# Patient Record
Sex: FEMALE | Race: ASIAN | NOT HISPANIC OR LATINO | URBAN - METROPOLITAN AREA
[De-identification: names, ages, dates, MRNs, and addresses within clinical notes are randomized per-mention and may not be internally consistent; named-entity substitution may affect disease eponyms.]

---

## 2020-01-26 ENCOUNTER — EMERGENCY (EMERGENCY)
Facility: HOSPITAL | Age: 55
LOS: 1 days | Discharge: HOME | End: 2020-01-26
Admitting: EMERGENCY MEDICINE
Payer: SELF-PAY

## 2020-01-26 VITALS
HEART RATE: 100 BPM | SYSTOLIC BLOOD PRESSURE: 109 MMHG | DIASTOLIC BLOOD PRESSURE: 75 MMHG | RESPIRATION RATE: 20 BRPM | TEMPERATURE: 98 F | OXYGEN SATURATION: 100 %

## 2020-01-26 PROCEDURE — 99282 EMERGENCY DEPT VISIT SF MDM: CPT

## 2020-01-26 PROCEDURE — 99053 MED SERV 10PM-8AM 24 HR FAC: CPT

## 2020-01-26 NOTE — ED PROVIDER NOTE - OBJECTIVE STATEMENT
53 yo f with pmh of CP bib aid for abrasion to L finger. As per aide another aide noticed it around 9pm. Pt is always picking at her fingers and she thinks maybe the nail was long and it broke and cut her skin. No known injury. Aide states the agency made her come to be evaluated.

## 2020-01-26 NOTE — ED PROVIDER NOTE - CLINICAL SUMMARY MEDICAL DECISION MAKING FREE TEXT BOX
53 yo f with pmh of CP bib aid for abrasion to L finger. As per aide another aide noticed it around 9pm. Pt is always picking at her fingers and she thinks maybe the nail was long and it broke and cut her skin. No known injury. Aide states the agency made her come to be evaluated. L thumb- small 0.5cm abrasion to tip of thumb

## 2020-01-26 NOTE — ED ADULT NURSE NOTE - CHPI ED NUR SYMPTOMS NEG
no bleeding at site/no vomiting/no drainage/no pain/no chills/no fever/no purulent drainage/no rectal pain/no redness/no blood in mucus

## 2020-01-26 NOTE — ED PROVIDER NOTE - NSFOLLOWUPINSTRUCTIONS_ED_ALL_ED_FT
Keep hands clean. Apply bacitracin to finger. Return to ED for pain, fever, chills, swelling, redness or any other concerning symptoms.     Skin Tear    WHAT YOU NEED TO KNOW:    A skin tear occurs when the layers of weakened skin split open from an injury. It is important to treat and prevent skin tears to prevent infection.    DISCHARGE INSTRUCTIONS:    Call your doctor if:     You have a fever or chills.      Blood soaks through your bandage.      You have redness, swelling, pus, or a bad odor coming from your wound.      You have severe pain.      Your wound tears open again.      You have questions or concerns about your condition or care.    Medicines:     Medicines may be given to decrease pain or treat a bacterial infection.      Take your medicine as directed. Contact your healthcare provider if you think your medicine is not helping or if you have side effects. Tell him of her if you are allergic to any medicine. Keep a list of the medicines, vitamins, and herbs you take. Include the amounts, and when and why you take them. Bring the list or the pill bottles to follow-up visits. Carry your medicine list with you in case of an emergency.    Prevent a skin tear:     Clean, moisturize, and protect your skin. Baths, hot showers, and soap can dry your skin and increase your risk for skin tears. Take lukewarm showers, use mild soap as directed, and gently pat your skin dry. Use lotion to keep your skin moist after you shower. Wear long sleeves, pants, and protective footwear.      Move carefully. Ask for help if you cannot lift yourself. Do not drag your skin when you move.      Keep your home safe. Cover sharp corners, keep your pathways clear, and turn on lights so you can see clearly. Ask for more information if you have questions about home safety.      Drink liquids as directed. Ask your provider how much liquid to drink each day and which liquids are best for you. Liquids will help keep your skin moist and protected from another skin tear.      Eat high-protein foods to help with wound healing. Examples are lean meats, fish, low-fat dairy products, and beans.     Follow up with your healthcare provider as directed: Write down your questions so you remember to ask them during your visits.

## 2020-01-26 NOTE — ED PROVIDER NOTE - PATIENT PORTAL LINK FT
You can access the FollowMyHealth Patient Portal offered by [facility] by registering at the following website: [email or web address]. By joining [employer]’s FollowMyHealth portal, you will also be able to view your health information using other applications (apps) compatible with our system.

## 2020-01-26 NOTE — ED PROVIDER NOTE - PHYSICAL EXAMINATION
CONSTITUTIONAL: short stature    HEAD: Normocephalic; atraumatic.   EYES: PERRL; EOM intact; conjunctiva and sclera clear  ENT: normal nose; no rhinorrhea; normal pharynx with no erythema or lesions.   NECK: Supple; non-tender;   CARDIOVASCULAR: Normal S1, S2; no murmurs, rubs, or gallops. Regular rate and rhythm.   RESPIRATORY: Breathing easily; breath sounds clear and equal bilaterally; no wheezes, rhonchi, or rales.  MSK: FROM at all extremities, normal tone   EXT: No cyanosis or edema; N/V intact  SKIN: L thumb- small 0.5cm abrasion to tip of thumb

## 2020-01-26 NOTE — ED ADULT NURSE NOTE - NSIMPLEMENTINTERV_GEN_ALL_ED
Implemented All Universal Safety Interventions:  [place] to call system. Call bell, personal items and telephone within reach. Instruct patient to call for assistance. Room bathroom lighting operational. Non-slip footwear when patient is off stretcher. Physically safe environment: no spills, clutter or unnecessary equipment. Stretcher in lowest position, wheels locked, appropriate side rails in place.

## 2020-02-01 DIAGNOSIS — S60.312A ABRASION OF LEFT THUMB, INITIAL ENCOUNTER: ICD-10-CM
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
## 2020-06-27 ENCOUNTER — EMERGENCY (EMERGENCY)
Facility: HOSPITAL | Age: 55
LOS: 1 days | Discharge: HOME | End: 2020-06-27
Attending: EMERGENCY MEDICINE | Admitting: EMERGENCY MEDICINE
Payer: MEDICARE

## 2020-06-27 VITALS
SYSTOLIC BLOOD PRESSURE: 112 MMHG | TEMPERATURE: 98 F | RESPIRATION RATE: 18 BRPM | OXYGEN SATURATION: 98 % | HEART RATE: 95 BPM | DIASTOLIC BLOOD PRESSURE: 80 MMHG

## 2020-06-27 VITALS
TEMPERATURE: 98 F | SYSTOLIC BLOOD PRESSURE: 111 MMHG | DIASTOLIC BLOOD PRESSURE: 73 MMHG | RESPIRATION RATE: 16 BRPM | OXYGEN SATURATION: 97 % | HEART RATE: 70 BPM

## 2020-06-27 LAB
ALBUMIN SERPL ELPH-MCNC: 3.7 G/DL — SIGNIFICANT CHANGE UP (ref 3.3–5)
ALP SERPL-CCNC: 91 U/L — SIGNIFICANT CHANGE UP (ref 40–120)
ALT FLD-CCNC: 16 U/L — SIGNIFICANT CHANGE UP (ref 10–45)
ANION GAP SERPL CALC-SCNC: 11 MMOL/L — SIGNIFICANT CHANGE UP (ref 5–17)
APTT BLD: 28.8 SEC — SIGNIFICANT CHANGE UP (ref 27.5–36.3)
AST SERPL-CCNC: 19 U/L — SIGNIFICANT CHANGE UP (ref 10–40)
BASOPHILS # BLD AUTO: 0.08 K/UL — SIGNIFICANT CHANGE UP (ref 0–0.2)
BASOPHILS NFR BLD AUTO: 1.6 % — SIGNIFICANT CHANGE UP (ref 0–2)
BILIRUB SERPL-MCNC: 0.3 MG/DL — SIGNIFICANT CHANGE UP (ref 0.2–1.2)
BUN SERPL-MCNC: 18 MG/DL — SIGNIFICANT CHANGE UP (ref 7–23)
CALCIUM SERPL-MCNC: 9.6 MG/DL — SIGNIFICANT CHANGE UP (ref 8.4–10.5)
CHLORIDE SERPL-SCNC: 108 MMOL/L — SIGNIFICANT CHANGE UP (ref 96–108)
CO2 SERPL-SCNC: 28 MMOL/L — SIGNIFICANT CHANGE UP (ref 22–31)
CREAT SERPL-MCNC: 0.61 MG/DL — SIGNIFICANT CHANGE UP (ref 0.5–1.3)
EOSINOPHIL # BLD AUTO: 0.12 K/UL — SIGNIFICANT CHANGE UP (ref 0–0.5)
EOSINOPHIL NFR BLD AUTO: 2.4 % — SIGNIFICANT CHANGE UP (ref 0–6)
GLUCOSE SERPL-MCNC: 111 MG/DL — HIGH (ref 70–99)
HCT VFR BLD CALC: 47 % — HIGH (ref 34.5–45)
HGB BLD-MCNC: 15 G/DL — SIGNIFICANT CHANGE UP (ref 11.5–15.5)
IMM GRANULOCYTES NFR BLD AUTO: 0.6 % — SIGNIFICANT CHANGE UP (ref 0–1.5)
INR BLD: 1 — SIGNIFICANT CHANGE UP (ref 0.88–1.16)
LYMPHOCYTES # BLD AUTO: 1.41 K/UL — SIGNIFICANT CHANGE UP (ref 1–3.3)
LYMPHOCYTES # BLD AUTO: 28.4 % — SIGNIFICANT CHANGE UP (ref 13–44)
MCHC RBC-ENTMCNC: 30.9 PG — SIGNIFICANT CHANGE UP (ref 27–34)
MCHC RBC-ENTMCNC: 31.9 GM/DL — LOW (ref 32–36)
MCV RBC AUTO: 96.9 FL — SIGNIFICANT CHANGE UP (ref 80–100)
MONOCYTES # BLD AUTO: 0.39 K/UL — SIGNIFICANT CHANGE UP (ref 0–0.9)
MONOCYTES NFR BLD AUTO: 7.9 % — SIGNIFICANT CHANGE UP (ref 2–14)
NEUTROPHILS # BLD AUTO: 2.93 K/UL — SIGNIFICANT CHANGE UP (ref 1.8–7.4)
NEUTROPHILS NFR BLD AUTO: 59.1 % — SIGNIFICANT CHANGE UP (ref 43–77)
NRBC # BLD: 0 /100 WBCS — SIGNIFICANT CHANGE UP (ref 0–0)
PLATELET # BLD AUTO: 334 K/UL — SIGNIFICANT CHANGE UP (ref 150–400)
POTASSIUM SERPL-MCNC: 5.2 MMOL/L — SIGNIFICANT CHANGE UP (ref 3.5–5.3)
POTASSIUM SERPL-SCNC: 5.2 MMOL/L — SIGNIFICANT CHANGE UP (ref 3.5–5.3)
PROT SERPL-MCNC: 7.8 G/DL — SIGNIFICANT CHANGE UP (ref 6–8.3)
PROTHROM AB SERPL-ACNC: 11.4 SEC — SIGNIFICANT CHANGE UP (ref 10–12.9)
RBC # BLD: 4.85 M/UL — SIGNIFICANT CHANGE UP (ref 3.8–5.2)
RBC # FLD: 15.9 % — HIGH (ref 10.3–14.5)
SARS-COV-2 RNA SPEC QL NAA+PROBE: SIGNIFICANT CHANGE UP
SODIUM SERPL-SCNC: 147 MMOL/L — HIGH (ref 135–145)
WBC # BLD: 4.96 K/UL — SIGNIFICANT CHANGE UP (ref 3.8–10.5)
WBC # FLD AUTO: 4.96 K/UL — SIGNIFICANT CHANGE UP (ref 3.8–10.5)

## 2020-06-27 PROCEDURE — 70450 CT HEAD/BRAIN W/O DYE: CPT

## 2020-06-27 PROCEDURE — 72125 CT NECK SPINE W/O DYE: CPT

## 2020-06-27 PROCEDURE — 85610 PROTHROMBIN TIME: CPT

## 2020-06-27 PROCEDURE — 99053 MED SERV 10PM-8AM 24 HR FAC: CPT

## 2020-06-27 PROCEDURE — 87635 SARS-COV-2 COVID-19 AMP PRB: CPT

## 2020-06-27 PROCEDURE — 93005 ELECTROCARDIOGRAM TRACING: CPT

## 2020-06-27 PROCEDURE — 93010 ELECTROCARDIOGRAM REPORT: CPT

## 2020-06-27 PROCEDURE — 96374 THER/PROPH/DIAG INJ IV PUSH: CPT

## 2020-06-27 PROCEDURE — 96376 TX/PRO/DX INJ SAME DRUG ADON: CPT

## 2020-06-27 PROCEDURE — 85025 COMPLETE CBC W/AUTO DIFF WBC: CPT

## 2020-06-27 PROCEDURE — 72125 CT NECK SPINE W/O DYE: CPT | Mod: 26

## 2020-06-27 PROCEDURE — 80053 COMPREHEN METABOLIC PANEL: CPT

## 2020-06-27 PROCEDURE — 70450 CT HEAD/BRAIN W/O DYE: CPT | Mod: 26,76

## 2020-06-27 PROCEDURE — 99284 EMERGENCY DEPT VISIT MOD MDM: CPT | Mod: 25

## 2020-06-27 PROCEDURE — 85730 THROMBOPLASTIN TIME PARTIAL: CPT

## 2020-06-27 PROCEDURE — 71045 X-RAY EXAM CHEST 1 VIEW: CPT

## 2020-06-27 PROCEDURE — 99285 EMERGENCY DEPT VISIT HI MDM: CPT

## 2020-06-27 PROCEDURE — 36415 COLL VENOUS BLD VENIPUNCTURE: CPT

## 2020-06-27 PROCEDURE — 71045 X-RAY EXAM CHEST 1 VIEW: CPT | Mod: 26

## 2020-06-27 RX ORDER — LEVETIRACETAM 250 MG/1
500 TABLET, FILM COATED ORAL ONCE
Refills: 0 | Status: COMPLETED | OUTPATIENT
Start: 2020-06-27 | End: 2020-06-27

## 2020-06-27 RX ORDER — LEVETIRACETAM 250 MG/1
1 TABLET, FILM COATED ORAL
Qty: 14 | Refills: 0
Start: 2020-06-27 | End: 2020-07-03

## 2020-06-27 RX ORDER — LEVETIRACETAM 250 MG/1
500 TABLET, FILM COATED ORAL ONCE
Refills: 0 | Status: DISCONTINUED | OUTPATIENT
Start: 2020-06-27 | End: 2020-06-27

## 2020-06-27 RX ADMIN — Medication 0.5 MILLIGRAM(S): at 08:50

## 2020-06-27 RX ADMIN — LEVETIRACETAM 500 MILLIGRAM(S): 250 TABLET, FILM COATED ORAL at 17:03

## 2020-06-27 RX ADMIN — Medication 0.5 MILLIGRAM(S): at 13:24

## 2020-06-27 NOTE — ED ADULT TRIAGE NOTE - CHIEF COMPLAINT QUOTE
as per staff from the assisted living residence, pt. fell and had little shaking, a bump noted to the left side of the head, pt. has no hx of seizures and as per staff, pt. is at her baseline behavior at present time.

## 2020-06-27 NOTE — ED PROVIDER NOTE - CARE PROVIDER_API CALL
Wiley Pérez A  NEUROSURGERY  [address] [place] [address]  Phone: [phone]  Fax: [phone]  Follow Up Time:

## 2020-06-27 NOTE — ED ADULT NURSE REASSESSMENT NOTE - GENERAL PATIENT STATE
caregiver at bedside/comfortable appearance/resting/sleeping
at baseline per caregiver/comfortable appearance

## 2020-06-27 NOTE — CONSULT NOTE ADULT - SUBJECTIVE AND OBJECTIVE BOX
55 year old female with history of Down Syndrome presents to ED with caretaker from [facility] where she lives with around the clock care secondary to concern for fall today.  Caretaker notes fall was witnessed, stating she struck side of left head onto hard surface.  Caretaker notes she is acting herself (baseline mentation) on arrival to ED.  She denies any trauma injury, stating the only apparent injury was a "lump" to left head.  She denies noting any loss of consciousness, however caretaker does not questionable slight shaking movement for several seconds which is not described as jerking movements, but rather slight, non focal shaking.  Caretaker denies noting recent fever, chills, changes to baseline behaviors or any additional acute complaints or concerns at this time.  Additional history is limited as patient does not provide her own history.  Neurosurgery consulted due to head CT scan reading:     FINDINGS:    VENTRICLES AND SULCI: There is prominence of the ventricles, particularly the fourth, with associated mild cerebellar atrophy or hypoplasia. No hydrocephalus.  INTRA-AXIAL: No mass effect or midline shift. There is a 3 mm hyperattenuating focus at the gray-white junction of the superior left frontal lobe with subtle surrounding hypoattenuation. There is preservation of gray-white matter differentiation without CT evidence of acute transcortical infarction.  EXTRA-AXIAL: No extra-axial fluid collection.   VISUALIZED SINUSES: There is opacification of the visualized portions of the right ethmoid complex.  VISUALIZED MASTOIDS: There is extensive opacification of the bilateral mastoid air cells.  CALVARIUM: No acute fracture.  MISCELLANEOUS: There is a left temporal scalp hematoma.    IMPRESSION: A very small focus of hyperdensity at the superior left frontal lobe is surrounded by a lucent rim and is felt more likely chronic, possibly cavernoma or similar. If confirmation is desired, based on clinical parameters, correlation with repeat study after no sooner than 12 hours may be informative.    PE: Patient has the classic apparence of sandi syndrome. She is calm, not fully cooperative.   A&O x 2, PERRl, EOMI  no pronator drift. no obvious motor deficit. 5/5 x 4.  No sensory deficit to touch.    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  Plan: repeat CT 6 hrs after the first one. If stable may discharge home with keppra 500mg x 1 week.  Case discussed with Dr. Pérez.

## 2020-06-27 NOTE — ED PROVIDER NOTE - PATIENT PORTAL LINK FT
You can access the FollowMyHealth Patient Portal offered by [facility] by registering at the following website: [email or web address]. By joining [employer]’s FollowMyHealth portal, you will also be able to view your health information using other applications (apps) compatible with our system.

## 2020-06-27 NOTE — ED PROVIDER NOTE - CLINICAL SUMMARY MEDICAL DECISION MAKING FREE TEXT BOX
Patient in ED w concern for fall today.  She is accompanied by caretaker who states she is at baseline mentation, however they noticed hematoma to left scalp and wanted her medically evaluated.  Patient awake, alert on arrival to ED.  CT head, CT cervical spine completed.  CT head w small area of concern which is discussed with neurosurgical team.  No ASA/anticoagulant use, coags wnl.  Neuro sx requesting repeat CT head at 6 hour terrie, which is completed and stable.  Neuro sx recs for keppra 500mg PO BID x 7 days and prompt neurosurgery follow up outpatient with Dr. Pérez are discussed with caretaker at bedside.  Patient is given initial dose of keppra in ED and 7 day course is sent to pharmacy.  Caretaker aware to contact Dr. Pérez to setup follow up appointment this week.  Patient is advised to follow up with Dr. Pérez as well as their PCP in 1-2 days without fail.  Patient's caretaker instructed to return to ED immediately should their symptoms worsen or if there is any concern prior to the recommended PCP follow up.  Patient's caretaker is aware of plan and verbalizes their understanding.  Will discharge home at this time.

## 2020-06-27 NOTE — ED PROVIDER NOTE - NSFOLLOWUPINSTRUCTIONS_ED_ALL_ED_FT
Please follow up with Dr. Pérez as well as your primary physician in 1-2 days for re evaluation.  Please return to ER immediately should your symptoms worsen or if you have any concern prior to this recommended follow up.    Head Injury, Adult  There are many types of head injuries. They can be as minor as a bump. Some head injuries can be worse. Worse injuries include:  A strong hit to the head that shakes the brain back and forth causing damage (concussion).A bruise (contusion) of the brain. This means there is bleeding in the brain that can cause swelling.A cracked skull (skull fracture).Bleeding in the brain that gathers, gets thick (makes a clot), and forms a bump (hematoma).Most problems from a head injury come in the first 24 hours. However, you may still have side effects up to 7–10 days after your injury. It is important to watch your condition for any changes. You may need to be watched in the emergency department or urgent care, or you may need to stay in the hospital.  What are the causes?  There are many possible causes of a head injury. A serious head injury may be caused by:  A car accident.Bicycle or motorcycle accidents.Sports injuries.Falls.What are the signs or symptoms?  Symptoms of a head injury include a bruise, bump, or bleeding where the injury happened. Other physical symptoms may include:  Headache.Feeling sick to your stomach (nauseous) or vomiting.Dizziness.Feeling tired.Being uncomfortable around bright lights or loud noises.Shaking movements that you cannot control (seizures).Trouble being woken up.Passing out (fainting).Mental or emotional symptoms may include:  Feeling grumpy or cranky.Confusion and memory problems.Having trouble paying attention or concentrating.Changes in eating or sleeping habits.Feeling worried or nervous (anxious).Feeling sad (depressed).How is this treated?  Treatment for this condition depends on how severe the injury is and the type of injury you have. The main goal is to prevent complications and to allow the brain time to heal.  Mild head injury     If you have a mild head injury, you may be sent home and treatment may include:  Being watched. A responsible adult should stay with you for 24 hours after your injury and check on you often.Physical rest.Brain rest.Pain medicines.Severe head injury    If you have a severe head injury, treatment may include:  Being watched closely. This includes hospitalization with frequent physical exams.Medicines to:  Help with pain.Prevent shaking movements that you cannot control.Help with brain swelling.Using a machine that helps you breathe (ventilator).Treatments to manage the swelling inside the brain.Brain surgery. This may be needed to:  Remove a blood clot.Stop the bleeding.Remove a part of the skull. This allows room for the brain to swell.Follow these instructions at home:  Activity     Rest.Avoid activities that are hard or tiring.Make sure you get enough sleep.Limit activities that need a lot of thought or attention, such as:  Watching TV.Playing memory games and puzzles.Job-related work or homework.Working on the computer, social media, and texting.Avoid activities that could cause another head injury until your doctor says it is okay. This includes playing sports. Having another head injury, especially before the first one has healed, can be dangerous.Ask your doctor when it is safe for you to go back to your normal activities, such as work or school. Ask your doctor for a step-by-step plan for slowly going back to your normal activities.Ask your doctor when you can drive, ride a bicycle, or use heavy machinery. Do not do these activities if you are dizzy.Lifestyle        Do not drink alcohol until your doctor says it is okay.Do not use drugs.If it is harder than usual to remember things, write them down.If you are easily distracted, try to do one thing at a time.Talk with family members or close friends when making important decisions.Tell your friends, family, a trusted coworker, and  about your injury, symptoms, and limits (restrictions). Have them watch for any problems that are new or getting worse.General instructions     Take over-the-counter and prescription medicines only as told by your doctor.Have someone stay with you for 24 hours after your head injury. This person should watch you for any changes in your symptoms and be ready to get help.Keep all follow-up visits as told by your doctor. This is important.How is this prevented?     Work on your balance and strength. This can help you avoid falls.Wear a seatbelt when you are in a moving vehicle.Wear a helmet when you:  Ride a bicycle.Ski.Do any other sport or activity that has a risk of injury.If you drink alcohol:  Limit how much you use to:  0–1 drink a day for women.0–2 drinks a day for men.Be aware of how much alcohol is in your drink. In the [place] one drink equals one 12 oz bottle of beer (355 mL), one 5 oz glass of wine (148 mL), or one 1½ oz glass of hard liquor (44 mL).Make your home safer by:  Getting rid of clutter from the floors and stairs. This includes things that can make you trip.Using grab bars in bathrooms and handrails by stairs.Placing non-slip mats on floors and in bathtubs.Putting more light in dim areas.Get help right away if:  You have:  A very bad headache that is not helped by medicine.Trouble walking or weakness in your arms and legs.Clear or bloody fluid coming from your nose or ears.Changes in how you see (vision).Shaking movements that you cannot control.You lose your balance.You vomit.The black centers of your eyes (pupils) change in size.Your speech is slurred.Your dizziness gets worse.You pass out.You are sleepier than normal and have trouble staying awake.Your symptoms get worse.These symptoms may be an emergency. Do not wait to see if the symptoms will go away. Get medical help right away. Call your local emergency services (911 in the [place] Do not drive yourself to the hospital.   Summary  There are many types of head injuries. They can be as minor as a bump. Some head injuries can be worseTreatment for this condition depends on how severe the injury is and the type of injury you have.Ask your doctor when it is safe for you to go back to your normal activities, such as work or school.To prevent a head injury, wear a seat belt in a car, wear a helmet when you use a a bicycle, limit your alcohol use, and make your home safer.This information is not intended to replace advice given to you by your health care provider. Make sure you discuss any questions you have with your health care provider.    Document Released: 11/30/2009 Document Revised: 04/09/2020 Document Reviewed: 01/10/2020  Elsevier Patient Education © 2020 [employer]

## 2020-06-27 NOTE — ED PROVIDER NOTE - OBJECTIVE STATEMENT
55 year old female with history of Down Syndrome presents to ED with caretaker from [facility] where she lives with around the clock care secondary to concern for fall today.  Caretaker notes fall was witnessed, stating she struck side of left head onto hard surface.  Caretaker notes she is acting herself (baseline mentation) on arrival to ED.  She denies any trauma injury, stating the only apparent injury was a "lump" to left head.  She denies noting any loss of consciousness, however caretaker does not questionable slight shaking movement for several seconds which is not described as jerking movements, but rather slight, non focal shaking.  Caretaker denies noting recent fever, chills, changes to baseline behaviors or any additional acute complaints or concerns at this time.  Additional history is limited as patient does not provide her own history.

## 2020-06-27 NOTE — ED ADULT NURSE NOTE - NSIMPLEMENTINTERV_GEN_ALL_ED
Implemented All Fall Risk Interventions:  [place] to call system. Call bell, personal items and telephone within reach. Instruct patient to call for assistance. Room bathroom lighting operational. Non-slip footwear when patient is off stretcher. Physically safe environment: no spills, clutter or unnecessary equipment. Stretcher in lowest position, wheels locked, appropriate side rails in place. Provide visual cue, wrist band, yellow gown, etc. Monitor gait and stability. Monitor for mental status changes and reorient to person, place, and time. Review medications for side effects contributing to fall risk. Reinforce activity limits and safety measures with patient and family.

## 2020-07-01 DIAGNOSIS — S00.03XA CONTUSION OF SCALP, INITIAL ENCOUNTER: ICD-10-CM
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
## 2020-07-01 DIAGNOSIS — Z20.828 CONTACT WITH AND (SUSPECTED) EXPOSURE TO OTHER VIRAL COMMUNICABLE DISEASES: ICD-10-CM

## 2020-07-01 DIAGNOSIS — S09.90XA UNSPECIFIED INJURY OF HEAD, INITIAL ENCOUNTER: ICD-10-CM
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
## 2020-09-28 ENCOUNTER — EMERGENCY (EMERGENCY)
Facility: HOSPITAL | Age: 55
LOS: 1 days | Discharge: HOME | End: 2020-09-28
Attending: EMERGENCY MEDICINE | Admitting: EMERGENCY MEDICINE
Payer: MEDICARE

## 2020-09-28 VITALS
TEMPERATURE: 99 F | DIASTOLIC BLOOD PRESSURE: 85 MMHG | WEIGHT: 72.97 LBS | RESPIRATION RATE: 18 BRPM | SYSTOLIC BLOOD PRESSURE: 128 MMHG | HEART RATE: 94 BPM

## 2020-09-28 VITALS
HEART RATE: 88 BPM | TEMPERATURE: 98 F | SYSTOLIC BLOOD PRESSURE: 101 MMHG | DIASTOLIC BLOOD PRESSURE: 71 MMHG | OXYGEN SATURATION: 98 % | RESPIRATION RATE: 18 BRPM
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
## 2020-09-28 DIAGNOSIS — Z91.018 ALLERGY TO OTHER FOODS: ICD-10-CM
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
## 2020-09-28 DIAGNOSIS — S09.90XA UNSPECIFIED INJURY OF HEAD, INITIAL ENCOUNTER: ICD-10-CM

## 2020-09-28 DIAGNOSIS — S01.01XA LACERATION WITHOUT FOREIGN BODY OF SCALP, INITIAL ENCOUNTER: ICD-10-CM

## 2020-09-28 PROCEDURE — 12002 RPR S/N/AX/GEN/TRNK2.6-7.5CM: CPT

## 2020-09-28 PROCEDURE — 99284 EMERGENCY DEPT VISIT MOD MDM: CPT | Mod: 25

## 2020-09-28 RX ORDER — TETANUS TOXOID, REDUCED DIPHTHERIA TOXOID AND ACELLULAR PERTUSSIS VACCINE, ADSORBED 5; 2.5; 8; 8; 2.5 [IU]/.5ML; [IU]/.5ML; UG/.5ML; UG/.5ML; UG/.5ML
0.5 SUSPENSION INTRAMUSCULAR ONCE
Refills: 0 | Status: COMPLETED | OUTPATIENT
Start: 2020-09-28 | End: 2020-09-28

## 2020-09-28 RX ADMIN — TETANUS TOXOID, REDUCED DIPHTHERIA TOXOID AND ACELLULAR PERTUSSIS VACCINE, ADSORBED 0.5 MILLILITER(S): 5; 2.5; 8; 8; 2.5 SUSPENSION INTRAMUSCULAR at 21:27

## 2020-09-28 RX ADMIN — Medication 0.5 MILLIGRAM(S): at 22:12

## 2020-09-28 NOTE — ED PROVIDER NOTE - CLINICAL SUMMARY MEDICAL DECISION MAKING FREE TEXT BOX
55 F pmh down syndrome, nonverbal at baseline here with care taker from group home s/p fall w/ head trauma.  CT delayed 2/2 pt compliance, given IM ativan 0.5mg and PO benadryl.  CT head shows no ICH, infarct or midline shift, opacification involving bilateral mastoid air cells as well as the right middle ear cavity- pt has no signs of mastoiditis.  lac repaired with staples.  educated care taker on wound care and return for removal of staples.  discussed strict return parameters

## 2020-09-28 NOTE — ED ADULT NURSE NOTE - NSFALLRSKPASTHIST_ED_ALL_ED
September 23, 2020      Phani Jeronimo  [address]   [facility] [address]        Dear Mr.Mohamed Jeronimo,    We are writing to inform you of your test results.    Test results indicate you may require additional follow up, see comment below.    Resulted Orders   Hemoglobin A1c ([facility])   Result Value Ref Range    Hemoglobin A1C 9.1 (H) 4.1 - 5.7 %   Comprehensive metabolic panel   Result Value Ref Range    Sodium 138 133 - 144 mmol/L    Potassium 4.3 3.4 - 5.3 mmol/L    Chloride 106 94 - 109 mmol/L    Carbon Dioxide 24 20 - 32 mmol/L    Anion Gap 8 3 - 14 mmol/L    Glucose 191 (H) 70 - 99 mg/dL    Urea Nitrogen 18 7 - 30 mg/dL    Creatinine 0.99 0.66 - 1.25 mg/dL    GFR Estimate 81 >60 mL/min/[1.73_m2]      Comment:      Non  GFR Calc  Starting 12/18/2018, serum creatinine based estimated GFR (eGFR) will be   calculated using the Chronic Kidney Disease Epidemiology Collaboration   (CKD-EPI) equation.      GFR Estimate If Black >90 >60 mL/min/[1.73_m2]      Comment:       GFR Calc  Starting 12/18/2018, serum creatinine based estimated GFR (eGFR) will be   calculated using the Chronic Kidney Disease Epidemiology Collaboration   (CKD-EPI) equation.      Calcium 9.8 8.5 - 10.1 mg/dL    Bilirubin Total 0.4 0.2 - 1.3 mg/dL    Albumin 4.0 3.4 - 5.0 g/dL    Protein Total 7.8 6.8 - 8.8 g/dL    Alkaline Phosphatase 134 40 - 150 U/L    ALT 19 0 - 70 U/L    AST 10 0 - 45 U/L   CBC with platelets   Result Value Ref Range    WBC 9.9 4.0 - 11.0 10e9/L    RBC Count 5.12 4.4 - 5.9 10e12/L    Hemoglobin 14.6 13.3 - 17.7 g/dL    Hematocrit 45.3 40.0 - 53.0 %    MCV 89 78 - 100 fl    MCH 28.5 26.5 - 33.0 pg    MCHC 32.2 31.5 - 36.5 g/dL    RDW 13.0 10.0 - 15.0 %    Platelet Count 289 150 - 450 10e9/L   Albumin Random Urine Quantitative with Creat Ratio   Result Value Ref Range    Creatinine Urine 193 mg/dL    Albumin Urine mg/L 67 mg/L    Albumin Urine mg/g Cr 34.87 (H) 0 -  17 mg/g Cr   Magnesium   Result Value Ref Range    Magnesium 1.7 1.6 - 2.3 mg/dL   Lipid panel reflex to direct LDL Fasting   Result Value Ref Range    Cholesterol 164 <200 mg/dL    Triglycerides 207 (H) <150 mg/dL      Comment:      Borderline high:  150-199 mg/dl  High:             200-499 mg/dl  Very high:       >499 mg/dl      HDL Cholesterol 37 (L) >39 mg/dL    LDL Cholesterol Calculated 86 <100 mg/dL      Comment:      Desirable:       <100 mg/dl    Non HDL Cholesterol 127 <130 mg/dL     Virgil Jeronimo, here are your lab results. Unfortunately, being off the Victoza for over 1 month increased your 3 month blood sugar average to 9.1, This is quite a bit higher than you have run in the past. Your blood sugar is also high at 191.  Please continue to monitor your blood sugar over the next 1-2 weeks. If you do not see an improvement in your blood sugar down to < 150, then please return and we will need to review your medications. Your kidney function shows that you are spilling a little protein into your urine. This is likely a result of the higher sugars also. We will monitor this.  The rest of your lab work is stable. Your cholesterol levels are ok.  If you wish to discuss, please call or schedule a follow up appointment. Thank you.   If you have any questions or concerns, please call the clinic at the number listed above.       Sincerely,    RICARDO Rodriguez CNP, CNP                 yes

## 2020-09-28 NOTE — ED PROVIDER NOTE - OBJECTIVE STATEMENT
55 F pmh down syndrome, nonverbal at baseline here with care taker from group home s/p fall w/ head trauma.  Care taker reports pt was put to bed and a few minutes later staff heard loud thud, ran into pt room and noticed she was on the floor alert and awake holding back of her head.  Pt helped up and staff noted laceration to back of scalp.  Last tetanus unknown.  States she has been acting herself since, no vomiting or seizure activity.  No f/c, difficulty walking, limited ROM ext, difficulty breathing, abd pain, nv, use of AC/ASA., or other noted injuries.

## 2020-09-28 NOTE — ED PROVIDER NOTE - NSFOLLOWUPINSTRUCTIONS_ED_ALL_ED_FT
PLEASE FOLLOW-UP WITH YOUR PCP IN 1-2 DAYS.    PLEASE RETURN TO THE EMERGENCY DEPARTMENT IF SEVERE HEADACHE, VISION CHANGES, CONFUSION/CHANGE IN MENTAL STATUS, CHEST PAIN, SHORTNESS OF BREATH, DIFFICULTY AMBULATING, OTHER CONCERNING SYMPTOMS. DO NOT WET SCALP WOUND X24HR, THEN MAY ALLOW SOAP/WATER TO RUN OVER WOUND -- DO NOT SOAK OR SCRUB. MAY LEAVE OPEN TO AIR. MAY APPLY BACITRACIN TO WOUND AFTER SHOWERS. PLEASE FOLLOW-UP WITH YOUR PCP IN 7-10 DAYS FOR STAPLE REMOVAL. PLEASE RETURN TO THE EMERGENCY DEPARTMENT SOONER IF FEVER, WOUND OPENS UP, PURULENT DRAINAGE, RASH, INTOLERABLE PAIN, OTHER CONCERNING SYMPTOMS.    PLEASE RETURN TO THE EMERGENCY DEPARTMENT IF SEVERE HEADACHE, VISION CHANGES, CONFUSION/CHANGE IN MENTAL STATUS, CHEST PAIN, SHORTNESS OF BREATH, DIFFICULTY AMBULATING, OTHER CONCERNING SYMPTOMS.

## 2020-09-28 NOTE — ED PROVIDER NOTE - PATIENT PORTAL LINK FT
You can access the FollowMyHealth Patient Portal offered by [facility] by registering at the following website: [email or web address]. By joining [employer]’s FollowMyHealth portal, you will also be able to view your health information using other applications (apps) compatible with our system.

## 2020-09-28 NOTE — ED PROVIDER NOTE - SHIFT CHANGE DETAILS
55F downs/nonverbal c/o posterior head lac s/p unwitnessed fall after put into bed by attendant. cried immediately at remained at baseline since. s/p ct head/c spine, pending read. s/p tetanus. s/p staple repair of head lac.    dispo: pending ct read, anticipate dc home if no acute abnl

## 2020-09-29 PROCEDURE — 70450 CT HEAD/BRAIN W/O DYE: CPT | Mod: 26

## 2020-09-29 PROCEDURE — 96372 THER/PROPH/DIAG INJ SC/IM: CPT | Mod: XU

## 2020-09-29 PROCEDURE — 72125 CT NECK SPINE W/O DYE: CPT | Mod: 26

## 2020-09-29 PROCEDURE — 99284 EMERGENCY DEPT VISIT MOD MDM: CPT | Mod: 25

## 2020-09-29 PROCEDURE — 70450 CT HEAD/BRAIN W/O DYE: CPT

## 2020-09-29 PROCEDURE — 12002 RPR S/N/AX/GEN/TRNK2.6-7.5CM: CPT

## 2020-09-29 PROCEDURE — 90715 TDAP VACCINE 7 YRS/> IM: CPT

## 2020-09-29 PROCEDURE — 72125 CT NECK SPINE W/O DYE: CPT

## 2020-09-29 PROCEDURE — 90471 IMMUNIZATION ADMIN: CPT

## 2020-09-29 RX ORDER — DIPHENHYDRAMINE HCL 50 MG
25 CAPSULE ORAL ONCE
Refills: 0 | Status: COMPLETED | OUTPATIENT
Start: 2020-09-29 | End: 2020-09-29

## 2020-09-29 RX ADMIN — Medication 25 MILLIGRAM(S): at 00:42

## 2021-03-19 ENCOUNTER — EMERGENCY (EMERGENCY)
Facility: HOSPITAL | Age: 56
LOS: 1 days | Discharge: HOME | End: 2021-03-19
Attending: EMERGENCY MEDICINE | Admitting: EMERGENCY MEDICINE
Payer: MEDICARE

## 2021-03-19 VITALS
WEIGHT: 74.3 LBS | OXYGEN SATURATION: 98 % | HEART RATE: 84 BPM | SYSTOLIC BLOOD PRESSURE: 112 MMHG | RESPIRATION RATE: 18 BRPM | DIASTOLIC BLOOD PRESSURE: 76 MMHG | TEMPERATURE: 98 F

## 2021-03-19 DIAGNOSIS — S09.93XA UNSPECIFIED INJURY OF FACE, INITIAL ENCOUNTER: ICD-10-CM

## 2021-03-19 DIAGNOSIS — Z91.018 ALLERGY TO OTHER FOODS: ICD-10-CM
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
## 2021-03-19 DIAGNOSIS — S00.12XA CONTUSION OF LEFT EYELID AND PERIOCULAR AREA, INITIAL ENCOUNTER: ICD-10-CM
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
## 2021-03-19 DIAGNOSIS — Z79.899 OTHER LONG TERM (CURRENT) DRUG THERAPY: ICD-10-CM
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
## 2021-03-19 DIAGNOSIS — F03.90 UNSPECIFIED DEMENTIA WITHOUT BEHAVIORAL DISTURBANCE: ICD-10-CM

## 2021-03-19 DIAGNOSIS — S09.90XA UNSPECIFIED INJURY OF HEAD, INITIAL ENCOUNTER: ICD-10-CM

## 2021-03-19 PROBLEM — Q90.9 DOWN SYNDROME, UNSPECIFIED: Chronic | Status: ACTIVE | Noted: 2020-09-28

## 2021-03-19 PROCEDURE — 70450 CT HEAD/BRAIN W/O DYE: CPT

## 2021-03-19 PROCEDURE — 99284 EMERGENCY DEPT VISIT MOD MDM: CPT | Mod: 25

## 2021-03-19 PROCEDURE — 70486 CT MAXILLOFACIAL W/O DYE: CPT | Mod: 26,MG

## 2021-03-19 PROCEDURE — 70486 CT MAXILLOFACIAL W/O DYE: CPT

## 2021-03-19 PROCEDURE — G1004: CPT

## 2021-03-19 PROCEDURE — 99285 EMERGENCY DEPT VISIT HI MDM: CPT

## 2021-03-19 PROCEDURE — 70450 CT HEAD/BRAIN W/O DYE: CPT | Mod: 26,MG

## 2021-03-19 NOTE — ED PROVIDER NOTE - PROGRESS NOTE DETAILS
[place]: CTs lmtd by motion, no obvious pathology. Clinically very low suspicion for bleed/fx. pt behaving like usual self. Clinically no indication for further emergent ED workup or hospitalization at this time. Comfortable for dc, outpt f/u.

## 2021-03-19 NOTE — ED ADULT NURSE NOTE - NSIMPLEMENTINTERV_GEN_ALL_ED
Implemented All Fall with Harm Risk Interventions:  [place] to call system. Call bell, personal items and telephone within reach. Instruct patient to call for assistance. Room bathroom lighting operational. Non-slip footwear when patient is off stretcher. Physically safe environment: no spills, clutter or unnecessary equipment. Stretcher in lowest position, wheels locked, appropriate side rails in place. Provide visual cue, wrist band, yellow gown, etc. Monitor gait and stability. Monitor for mental status changes and reorient to person, place, and time. Review medications for side effects contributing to fall risk. Reinforce activity limits and safety measures with patient and family. Provide visual clues: red socks.

## 2021-03-19 NOTE — ED PROVIDER NOTE - OBJECTIVE STATEMENT
55F PMH down syndrome/non-verbal, lupus p/w ecchymosis. Pt unable to provide any information. At baseline per aide at bedside. Aide states that staff found pt with L periorbital ecchymosis this morning. Unsure how it happened. Acting like usual self. No reported fevers, vomiting, diarrhea. Normal PO intake. Pt has hx of several ED visits for similar falls.

## 2021-03-19 NOTE — ED PROVIDER NOTE - CLINICAL SUMMARY MEDICAL DECISION MAKING FREE TEXT BOX
55F PMH down syndrome/non-verbal, lupus p/w ecchymosis. Pt unable to provide any information. At baseline per aide at bedside. Aide states that staff found pt with L periorbital ecchymosis this morning. Unsure how it happened. Acting like usual self. No reported fevers, vomiting, diarrhea. Normal PO intake. Pt has hx of several ED visits for similar falls. Vitals wnl, exam as above.  ddx: Likely mechanical fall. Low suspicion for significant trauma.   CTs, reassess.

## 2021-03-19 NOTE — ED ADULT NURSE NOTE - OBJECTIVE STATEMENT
54 y/o female with hx of Down syndrome brought by caregiver for evaluation of "left black eye" as per caregiver patient walks around the house and "bumps into things, also doesn't sleep and hits her head against the wall constantly"  Pt is nonverbal, noted playing with seatbelt of wheelchair. Ecchymosis noted to left eye.

## 2021-03-19 NOTE — ED ADULT TRIAGE NOTE - TEMPERATURE IN FAHRENHEIT (DEGREES F)
Physical Therapy Daily Treatment    Visit Count: 3  Plan of Care: 9/11/2019 Through: 12/4/2019  Insurance Information:   Payor: [employer] - Medicare Complete  Authorization Needed: No  Maximum Visit Limit: Based on medical necessity  CoPay: $40.00  Notes: Follow Medicare guidelines  Call Ref #: Online  “Evaluation requires MD, NP, PA, or DO co-signature”     Referred by: Jailene Landon DPM; Next provider visit (if known/scheduled):   Medical Diagnosis (from order):       Diagnosis Information             Diagnosis      729.5 (ICD-9-CM) - M79.672 (ICD-10-CM) - Left foot pain      728.71 (ICD-9-CM) - M72.2 (ICD-10-CM) - Plantar fasciitis of left foot                  Treatment Diagnosis: bilateral feet left > right symptoms with impaired range of motion, impaired tissue mobility, impaired joint mobility/play, impaired gait     Date of onset/injury: a month ago  Diagnosis Precautions:   Chart reviewed at time of initial evaluation (relevant co-morbidities, allergies, tests and medications listed):          SUBJECTIVE       Pt states that she called that doctor.     Current Pain (0-10 scale): 4-5     Functional Change: none since eval    OBJECTIVE     Range of Motion (degrees)    Left Right   Date Initial Initial   Ankle Dorsiflexion (20) 5 11   Ankle Plantar Flexion (50)       Ankle Inversion (35) 40 50   Ankle Eversion (15) 20 20   Reported in degrees, active range of motion recorded unless noted as AA=active assistive or P=passive; standard testing positions unless otherwise noted, norms included in ( ); *=pain     Left Right   Range of Motion (degrees)  Initial Initial   First MTP Dorsiflexion (60 ) 40 60   First MTP Plantar Flexion (45)           Treatment   · Therapeutic Exercise (for increased strength, ROM, endurance, and activity tolerance)  · Nustep - Seat 8, UE 0,Level 5,  5 minutes  · gastroc stretch on tilt board  ·   · Ultrasound (for increased blood flow, decreased pain, or increased tissue  healing)  Ultrasound (87733):  Location: left plantar facia  Position: prone  Duration: 10 minutes with 5 minutes setup and skin check Duty Cycle: 100% duty cycle  Frequency: 3 Mhz  Intensity: 1.2 W/cm2   Results: decreased pain; no adverse reaction to treatment     · Manual (for increased scar, joint, muscle, or tendon movement, extensibility, and flexibility)   · Prone STM to left and right achilles and plantar facia  · Instrument Assisted Soft Tissue Mobilization with boat and boomarang with sweep pattern.  · kinesiotape mechanical correction for arch      Skilled input: verbal instruction/cues, tactile instruction/cues    Home Program:       Writer verbally educated the patient and received verbal consent from the patient on hand placement, positioning of patient, and techniques to be performed today including therapist position for techniques as described above and how they are pertinent to the patient's plan of care.      Suggestions for next session as indicated: progress per plan of care, STM and US to plantar facia.  Balance    ASSESSMENT   Pt tolerated session well.  She felt much better with walking after session  Pain after treatment (patient reported, 0-10 scale): 1  Result of above outlined education: Verbalizes understanding, Demonstrates understanding and Needs reinforcement    THERAPY DAILY BILLING   Insurance: [facility] MEDICARE COMPLETE 2. N/A    Evaluation Procedures:  No evaluation codes were used on this date of service    Timed Procedures:  Manual Therapy, 15 minutes  Therapeutic Exercise, 15 minutes  Ultrasound, 15 minutes    Untimed Procedures:  No untimed codes were used on this date of service    Total Treatment Time: 45 minutes   98.4

## 2021-03-19 NOTE — ED ADULT TRIAGE NOTE - CHIEF COMPLAINT QUOTE
Pt wheeled in by caregiver c.o L eye swelling and bruising. caregiver states "I just noticed the bruise when I started my shift today." no blood thinners. unknown LOC. pt actively normally as per caregiver. PT non-verbal. PMH of down syndrome discoid lupus, vit d deficiency.

## 2021-03-19 NOTE — ED PROVIDER NOTE - NSFOLLOWUPINSTRUCTIONS_ED_ALL_ED_FT
The CT scans were limited because of movement but there is no significant pathology.    It can take a week or 2 for the bruising to improve.     Can take tylenol 650mg every 6hrs as needed for pain.    Stay well hydrated.    Follow up with primary doctor within 1-2 days.    Return to ER for persistent fever/vomit, uncontrolled pain, focal weakness/numbness, vision changes, worsening breathing, worsening lightheaded.    Head Injury, Adult    There are many types of head injuries. Head injuries can be as minor as a bump, or they can be more severe. More severe head injuries include:  A jarring injury to the brain (concussion).  A bruise of the brain (contusion). This means there is bleeding in the brain that can cause swelling.  A cracked skull (skull fracture).  Bleeding in the brain that collects, clots, and forms a bump (hematoma).    After a head injury, you may need to be observed for a while in the emergency department or urgent care. Sometimes admission to the hospital is needed.    After a head injury has happened, most problems occur within the first 24 hours, but side effects may occur up to 7–10 days after the injury. It is important to watch your condition for any changes.    What are the causes?  There are many possible causes of a head injury. A serious head injury may happen to someone who is in a car accident (motor vehicle collision). Other causes of major head injuries include bicycle or motorcycle accidents, sports injuries, and falls.    Risk factors  This condition is more likely to occur in people who:  Drink a lot of alcohol or use drugs.  Are over the age of 65.  Are at risk for falls.    What are the symptoms?  There are many possible symptoms of a head injury. Visible symptoms of a head injury include a bruise, bump, or bleeding at the site of the injury. Other non-visible symptoms include:  Feeling sleepy or not being able to stay awake.  Passing out.  Headache.  Seizures.  Dizziness.  Confusion.  Memory problems.  Nausea or vomiting.  Other possible symptoms that may develop after the head injury include:  Poor attention and concentration.  Fatigue or tiring easily.  Irritability.  Being uncomfortable around bright lights or loud noises.  Anxiety or depression.  Disturbed sleep.    How is this diagnosed?  This condition can usually be diagnosed based on your symptoms, a description of the injury, and a physical exam. You may also have imaging tests done, such as a CT scan or MRI. You will also be closely watched.    How is this treated?  Treatment for this condition depends on the severity and type of injury you have. The main goal of treatment is to prevent complications and allow the brain time to heal.    For mild head injury, you may be sent home and treatment may include:  Observation. A responsible adult should stay with you for 24 hours after your injury and check on you often.  Physical rest.  Brain rest.  Pain medicines.  For severe brain injury, treatment may include:  Close observation. This includes hospitalization with frequent physical exams. You may need to go to a hospital that specializes in head injury.  Pain medicines.  Breathing support. This may include using a ventilator.  Managing the pressure inside the brain (intracranial pressure, or ICP). This may include:  Monitoring the ICP.  Giving medicines to decrease the ICP.  Positioning you to decrease the ICP.  Medicine to prevent seizures.  Surgery to stop bleeding or to remove blood clots (craniotomy).  Surgery to remove part of the skull (decompressive craniectomy). This allows room for the brain to swell.    Follow these instructions at home:  Activity   Rest as much as possible and avoid activities that are physically hard or tiring.  Make sure you get enough sleep.  Limit activities that require a lot of thought or attention, such as:  Watching TV.  Playing memory games and puzzles.  Job-related work or homework.  Working on the computer, social media, and texting.  Avoid activities that could cause another head injury, such as playing sports, until your health care provider approves. Having another head injury, especially before the first one has healed, can be dangerous.    Ask your health care provider when it is safe for you to return to your regular activities, including work or school. Ask your health care provider for a step-by-step plan for gradually returning to activities.  Ask your health care provider when you can drive, ride a bicycle, or use heavy machinery. Your ability to react may be slower after a brain injury. Never do these activities if you are dizzy.    Lifestyle     Do not drink alcohol until your health care provider approves, and avoid drug use. Alcohol and certain drugs may slow your recovery and can put you at risk of further injury.  If it is harder than usual to remember things, write them down.  If you are easily distracted, try to do one thing at a time.  Talk with family members or close friends when making important decisions.  Tell your friends, family, a trusted colleague, and  about your injury, symptoms, and restrictions. Have them watch for any new or worsening problems.  General instructions     Take over-the-counter and prescription medicines only as told by your health care provider.  Have someone stay with you for 24 hours after your head injury. This person should watch you for any changes in your symptoms and be ready to seek medical help, as needed.  Keep all follow-up visits as told by your health care provider. This is important.    How is this prevented?  Work on improving your balance and strength to avoid falls.  Wear a seatbelt when you are in a moving vehicle.  Wear a helmet when riding a bicycle, skiing, or doing any other sport or activity that has a risk of injury.  Drink alcohol only in moderation.  Take safety measures in your home, such as:  Removing clutter and tripping hazards from floors and stairways.  Using grab bars in bathrooms and handrails by stairs.  Placing non-slip mats on floors and in bathtubs.  Improving lighting in dim areas.    Get help right away if:  You have:  A severe headache that is not helped by medicine.  Trouble walking, have weakness in your arms and legs, or lose your balance.  Clear or bloody fluid coming from your nose or ears.  Changes in your vision.  A seizure.  You vomit.  Your symptoms get worse.  Your speech is slurred.  You pass out.  You are sleepier and have trouble staying awake.  Your pupils change size.  These symptoms may represent a serious problem that is an emergency. Do not wait to see if the symptoms will go away. Get medical help right away. Call your local emergency services (911 in the [place] Do not drive yourself to the hospital.     Post-Concussion Syndrome    A concussion is a brain injury from a direct hit (blow) to your head or body. This blow causes your brain to shake quickly back and forth inside your skull. This can damage brain cells and cause chemical changes in your brain. Concussions are usually not life-threatening but can cause several serious symptoms.    Post-concussion syndrome is when symptoms that occur after a concussion last longer than normal. These symptoms can last from weeks to months.    What are the causes?  The cause of this condition is not known. It can happen whether your head injury was mild or severe.    What increases the risk?  You are more likely to develop this condition if:  You are female.  You are a child, teen, or young adult.  You had a past head injury.  You have a history of headaches.  You have depression or anxiety.    What are the signs or symptoms?    Physical symptoms   Headaches.  Tiredness.  Dizziness.  Weakness.  Blurry vision.  Sensitivity to light.  Hearing difficulties.  Mental and emotional symptoms     Memory difficulties.  Difficulty with concentration.  Difficulty sleeping or staying asleep.  Feeling irritable.  Anxiety or depression.  Difficulty learning new things.    How is this diagnosed?  This condition may be diagnosed based on:  Your symptoms.  A description of your injury.  Your medical history.  Your health care provider may order other tests such as:  Brain function tests (neurological testing).  CT scan.    How is this treated?  Treatment for this condition may depend on your symptoms. Symptoms usually go away on their own over time. Treatments may include:  Medicines for headaches.  Resting your brain and body for a few days after your injury.  Rehabilitation therapy, such as:  Physical or occupational therapy. This may include exercises to help with balance and dizziness.  Mental health counseling.  Speech therapy.  Vision therapy. A brain and eye specialist can recommend treatments for vision problems.    Follow these instructions at home:  Medicines     Take over-the-counter and prescription medicines only as told by your health care provider.  Avoid opioid prescription pain medicines when recovering from a concussion.  Activity     Limit your mental activities for the first few days after your injury, such as:  Homework or job-related work.  Complex thinking.  Watching TV, and using a computer or phone.  Playing memory games and puzzles.  Gradually return to your normal activity level. If a certain activity brings on your symptoms, stop or slow down until you can do the activity without it triggering your symptoms.  Limit physical activity, such as exercise or sports, for the first few days after a concussion. Gradually return to normal activity as told by your health care provider.  If a certain activity brings on your symptoms, stop or slow down until you can do the activity without it triggering your symptoms.  Rest. Rest helps your brain heal. Make sure you:  Get plenty of sleep at night. Most adults should get at least 7–9 hours of sleep each night.  Rest during the day. Take naps or rest breaks when you feel tired.  Do not do high-risk activities that could cause a second concussion, such as riding a bike or playing sports. Having another concussion before the first one has healed can be dangerous.    General instructions   Do not drink alcohol until your health care provider says you can.  Keep track of the frequency and the severity of your symptoms. Give this information to your health care provider.  Keep all follow-up visits as directed by your health care provider. This is important.  Contact a health care provider if:  Your symptoms do not improve.  You have another injury.  Get help right away if you:  Have a severe or worsening headache.  Are confused.  Have trouble staying awake.  Pass out.  Vomit.  Have weakness or numbness in any part of your body.  Have a seizure.  Have trouble speaking.  Summary  Post-concussion syndrome is when symptoms that occur after a concussion last longer than normal.  Symptoms usually go away on their own over time. Depending on your symptoms, you may need treatment, such as medicines or rehabilitation therapy.  Rest your brain and body for a few days after your injury. Gradually return to activities, as told by your health care provider.  Get plenty of sleep, and avoid alcohol and opioid pain medicines while recovering from a concussion.

## 2021-03-19 NOTE — ED PROVIDER NOTE - PATIENT PORTAL LINK FT
You can access the FollowMyHealth Patient Portal offered by [facility] by registering at the following website: [email or web address]. By joining [employer]’s FollowMyHealth portal, you will also be able to view your health information using other applications (apps) compatible with our system.

## 2021-03-19 NOTE — ED PROVIDER NOTE - PHYSICAL EXAMINATION
L periorbital ecchymosis, no bony ttp.   unable to assess acuity or IOP (pt not tolerating). No proptosis. PERRL.   No spinal ttp, neck FROM. No bony ttp, FROM all extremities. Normal equal distal pulses.  at mental/physical status baseline per aide

## 2022-04-01 ENCOUNTER — EMERGENCY (EMERGENCY)
Facility: HOSPITAL | Age: 57
LOS: 1 days | Discharge: HOME | End: 2022-04-01
Attending: EMERGENCY MEDICINE | Admitting: SURGERY
Payer: MEDICARE

## 2022-04-01 VITALS
TEMPERATURE: 98 F | RESPIRATION RATE: 18 BRPM | HEART RATE: 95 BPM | SYSTOLIC BLOOD PRESSURE: 96 MMHG | DIASTOLIC BLOOD PRESSURE: 61 MMHG | OXYGEN SATURATION: 93 %

## 2022-04-01 DIAGNOSIS — S09.90XA UNSPECIFIED INJURY OF HEAD, INITIAL ENCOUNTER: ICD-10-CM

## 2022-04-01 DIAGNOSIS — Z91.013 ALLERGY TO SEAFOOD: ICD-10-CM

## 2022-04-01 DIAGNOSIS — S00.91XA ABRASION OF UNSPECIFIED PART OF HEAD, INITIAL ENCOUNTER: ICD-10-CM
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
## 2022-04-01 DIAGNOSIS — Q90.9 DOWN SYNDROME, UNSPECIFIED: ICD-10-CM

## 2022-04-01 PROCEDURE — 99284 EMERGENCY DEPT VISIT MOD MDM: CPT

## 2022-04-01 PROCEDURE — 70450 CT HEAD/BRAIN W/O DYE: CPT | Mod: 26,MA

## 2022-04-01 PROCEDURE — 70450 CT HEAD/BRAIN W/O DYE: CPT | Mod: MA

## 2022-04-01 PROCEDURE — 99284 EMERGENCY DEPT VISIT MOD MDM: CPT | Mod: 25

## 2022-04-01 RX ORDER — ACETAMINOPHEN 500 MG
650 TABLET ORAL ONCE
Refills: 0 | Status: COMPLETED | OUTPATIENT
Start: 2022-04-01 | End: 2022-04-01

## 2022-04-01 RX ORDER — BACITRACIN ZINC 500 UNIT/G
1 OINTMENT IN PACKET (EA) TOPICAL ONCE
Refills: 0 | Status: COMPLETED | OUTPATIENT
Start: 2022-04-01 | End: 2022-04-01

## 2022-04-01 RX ADMIN — Medication 650 MILLIGRAM(S): at 10:24

## 2022-04-01 RX ADMIN — Medication 1 APPLICATION(S): at 10:07

## 2022-04-01 NOTE — ED PROVIDER NOTE - PHYSICAL EXAMINATION
GEN: Down's, WN, well developed, awake, in no apparent distress. NTAF  ENT: Airway patent, Nasal mucosa clear. Mouth with normal mucosa.  EYES: Clear bilaterally. PERRL, EOMI  RESPIRATORY: Breathing comfortably with normal RR.    CARDIAC: Regular rate and rhythm  ABDOMEN: Soft, nontender, +bowel sounds, no rebound, rigidity, or guarding.  MSK: Range of motion is not limited, no deformities noted.  NEURO: awake, BYRNE x 4, ambulatory, Down's syndrome, noncommunicative   SKIN: Skin normal color for race, warm, dry, small superficial abrasion to right forehead at hairline, no active bleeding, no FB.

## 2022-04-01 NOTE — ED PROVIDER NOTE - CLINICAL SUMMARY MEDICAL DECISION MAKING FREE TEXT BOX
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 Per aid, she was going outside to an activity when the patient lost her balance and fell, hitting/scratching the right side of her head on a wooden chair, no LOC, pt got up immediately. She is at her baseline mental status. Pt with hx of frequent falls, has been seen at [facility] several times for this. Per EMR, tdap last updated 2020. No other complaints per aide. Pt is not able to provide further hx 2/2 Down's.  VSS, no focal neuro deficits, +Down's, noncommunicative, Right forehead abrasion was cleaned with saline and covered with bacitracin and clean dressing.   CT head to r/o ICH. If negative for acute injury, anticipate DC home with aid.

## 2022-04-01 NOTE — ED PROVIDER NOTE - CARE PLAN
None Principal Discharge DX:	Head trauma  Secondary Diagnosis:	Down's syndrome   1 Principal Discharge DX:	Head trauma  Secondary Diagnosis:	Down's syndrome  Secondary Diagnosis:	Abrasion

## 2022-04-01 NOTE — ED PROVIDER NOTE - PATIENT PORTAL LINK FT
You can access the FollowMyHealth Patient Portal offered by [facility] by registering at the following website: [email or web address]. By joining [employer]’s FollowMyHealth portal, you will also be able to view your health information using other applications (apps) compatible with our system.

## 2022-04-01 NOTE — ED PROVIDER NOTE - MDM ORDERS SUBMITTED SELECTION
Clindamycin Pregnancy And Lactation Text: This medication can be used in pregnancy if certain situations. Clindamycin is also present in breast milk. Imaging Studies/Medications

## 2022-04-01 NOTE — ED PROVIDER NOTE - NSFOLLOWUPINSTRUCTIONS_ED_ALL_ED_FT
The Cat scan of the head did not show bleeding. Please keep wound clean and dry and apply bacitracin daily. Please watch patient closely so she does not fall. Give Tylenol for pain as needed.     Please follow up with your primary care physician.    Return to the Emergency Department if you have any new or worsening symptoms, or for any other concerns. Please read below for further information.    Head Injury, Adult  There are many types of head injuries. They can be as minor as a bump. Some head injuries can be worse. Worse injuries include:  A strong hit to the head that hurts the brain (concussion).  A bruise of the brain (contusion). This means there is bleeding in the brain that can cause swelling.  A cracked skull (skull fracture).  Bleeding in the brain that gathers, gets thick (makes a clot), and forms a bump (hematoma).    Most problems from a head injury come in the first 24 hours. However, you may still have side effects up to 7–10 days after your injury. It is important to watch your condition for any changes.  Follow these instructions at home:    Activity   Rest as much as possible.  Avoid activities that are hard or tiring.  Make sure you get enough sleep.  Limit activities that need a lot of thought or attention, such as:    Watching TV.  Playing memory games and puzzles.  Job-related work or homework.  Working on the computer, social media, and texting.    Avoid activities that could cause another head injury until your doctor says it is okay. This includes playing sports.  Ask your doctor when it is safe for you to go back to your normal activities, such as work or school. Ask your doctor for a step-by-step plan for slowly going back to your normal activities.  Ask your doctor when you can drive, ride a bicycle, or use heavy machinery. Never do these activities if you are dizzy.  Lifestyle     Do not drink alcohol until your doctor says it is okay.  Avoid drug use.  If it is harder than usual to remember things, write them down.  If you are easily distracted, try to do one thing at a time.  Talk with family members or close friends when making important decisions.  Tell your friends, family, a trusted coworker, and  about your injury, symptoms, and limits (restrictions). Have them watch for any problems that are new or getting worse.  General instructions     Take over-the-counter and prescription medicines only as told by your doctor.  Have someone stay with you for 24 hours after your head injury. This person should watch you for any changes in your symptoms and be ready to get help.  Keep all follow-up visits as told by your doctor. This is important.  How is this prevented?  Work on your balance and strength. This can help you avoid falls.  Wear a seatbelt when you are in a moving vehicle.  Wear a helmet when:    Riding a bicycle.  Skiing.  Doing any other sport or activity that has a risk of injury.    Drink alcohol only in moderation.  Make your home safer by:    Getting rid of clutter from the floors and stairs, like things that can make you trip.  Using grab bars in bathrooms and handrails by stairs.  Placing non-slip mats on floors and in bathtubs.  Putting more light in dim areas.    Get help right away if:  You have:    A very bad (severe) headache that is not helped by medicine.  Trouble walking or weakness in your arms and legs.  Clear or bloody fluid coming from your nose or ears.  Changes in your seeing (vision).  Jerky movements that you cannot control (seizure).    You throw up (vomit).  Your symptoms get worse.  You lose balance.  Your speech is slurred.  You pass out.  You are sleepier and have trouble staying awake.  The black centers of your eyes (pupils) change in size.  These symptoms may be an emergency. Do not wait to see if the symptoms will go away. Get medical help right away. Call your local emergency services (911 in the [place] Do not drive yourself to the hospital.     This information is not intended to replace advice given to you by your health care provider. Make sure you discuss any questions you have with your health care provider.      Abrasion  An abrasion is a cut or a scrape on your skin. You must take care of your wound so germs do not get in it and cause infection.    What are the causes?    This condition is caused by rubbing your skin on something or falling on a surface, such as the ground. When your skin rubs on something, some layers of skin may rub off.    What are the signs or symptoms?    •A cut or a scrape.   •Bleeding.  •A red or pink spot.  •A bruise under your wound.    How is this treated?  This condition may be treated with:  •Cleaning your wound.  •Putting ointment on your wound.  •Putting a bandage on your wound.  •Getting a tetanus shot.    Follow these instructions at home:  Your doctor may tell you to do these things:    Medicines     •Take or use over-the-counter and prescription medicines only as told by your doctor.    •If you were prescribed an antibiotic medicine, use it as told by your doctor. Do not stop using it even if you start to feel better.    Keep your wound clean   •Clean your wound 1 or 2 times a day or as often told by your doctor. To do this:  1.Wash your hands for at least 20 seconds with mild soap and water. Do this before and after you clean your wound.  2.Wash your wound with mild soap and water.  3.Rinse off the soap.  4.Pat your wound with a clean towel to dry it. Do not rub your wound.    •Keep your bandage clean and dry. Take it off and change it as told by your doctor.  •You may have to change your bandage one or more times a day, or as told by your doctor.    Watch for signs of infection   Check your wound every day for signs of infection. Check for:  •A red streak that goes away from your wound.  •Other redness.  •Swelling or more pain.  •Warmth.   •Blood, fluid, pus, or a bad smell.    Treat pain and swelling  •If told, put ice on the injured area. To do this:  •Put ice in a plastic bag.   •Place a towel between your skin and the bag.   •Leave the ice on for 20 minutes, 2–3 times a day.  •Take off the ice if your skin turns bright red. This is very important. If you cannot feel pain, heat, or cold, you have a greater risk of damage to the area.  •If you can, raise the injured area above the level of your heart while you are sitting or lying down.    General instructions  •Do not take baths, swim, or use a hot tub. Ask your doctor about taking showers or sponge baths.  •Keep all follow-up visits.    Contact a doctor if:  •You had a tetanus shot, and you have any of these where the needle went in:  •Swelling.  •Very bad pain.  •Redness.  •Bleeding.  •You have a lot of pain, and medicine does not help.  •You have a fever.  •You have any of these signs of infection in your wound:  •Redness, swelling, or more pain.  •Blood, fluid, pus, or a bad smell.  •Warmth.    Get help right away if:  •You have a red streak going away from your wound.    Summary  •An abrasion is a cut or a scrape on your skin. Take care of your wound so germs do not get in it.  •Clean your wound 1 or 2 times a day or as often as told. Change your bandage as told and use medicines as told.  •Call your doctor if you have a fever or if you have redness, swelling, or more pain in your wound.  •Call your doctor if you have blood, fluid, pus, or a bad smell in your wound.  •Get help right away if you have a red streak going away from your wound.  This information is not intended to replace advice given to you by your health care provider. Make sure you discuss any questions you have with your health care provider.

## 2022-04-01 NOTE — ED ADULT NURSE NOTE - OBJECTIVE STATEMENT
55 yo F pmhx down syndrome BIBEMS s/p witnessed mechanical trip and fall at group home this morning. +head inj to R frontal head w laceration noted, no active bleeding at this time and tetanus is UTD. Per aid, pt stood up immediately and is acting at baseline. Pt ambulatory aroud ED.

## 2022-04-01 NOTE — ED ADULT NURSE REASSESSMENT NOTE - NS ED NURSE REASSESS COMMENT FT1
Wound care completed on R frontal head wound. Wound cleansed with normal saline, Bacitracin applied, and wound covered. Pt unable to tolerate dressing and continues to rip dressing off. 2x2 dressing applied with kerlex to maintain dressing in place. Dressing remains clean, dry and intact at this time.

## 2022-04-28 ENCOUNTER — EMERGENCY (EMERGENCY)
Facility: HOSPITAL | Age: 57
LOS: 1 days | Discharge: HOME | End: 2022-04-28
Attending: STUDENT IN AN ORGANIZED HEALTH CARE EDUCATION/TRAINING PROGRAM | Admitting: STUDENT IN AN ORGANIZED HEALTH CARE EDUCATION/TRAINING PROGRAM
Payer: MEDICARE

## 2022-04-28 VITALS
TEMPERATURE: 98 F | HEART RATE: 112 BPM | DIASTOLIC BLOOD PRESSURE: 95 MMHG | SYSTOLIC BLOOD PRESSURE: 136 MMHG | OXYGEN SATURATION: 96 % | RESPIRATION RATE: 18 BRPM

## 2022-04-28 VITALS
SYSTOLIC BLOOD PRESSURE: 137 MMHG | HEART RATE: 106 BPM | RESPIRATION RATE: 18 BRPM | OXYGEN SATURATION: 96 % | DIASTOLIC BLOOD PRESSURE: 92 MMHG | TEMPERATURE: 97 F

## 2022-04-28 DIAGNOSIS — Z91.018 ALLERGY TO OTHER FOODS: ICD-10-CM

## 2022-04-28 DIAGNOSIS — L93.0 DISCOID LUPUS ERYTHEMATOSUS: ICD-10-CM

## 2022-04-28 DIAGNOSIS — S00.11XA CONTUSION OF RIGHT EYELID AND PERIOCULAR AREA, INITIAL ENCOUNTER: ICD-10-CM

## 2022-04-28 DIAGNOSIS — F03.90 UNSPECIFIED DEMENTIA WITHOUT BEHAVIORAL DISTURBANCE: ICD-10-CM
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
## 2022-04-28 DIAGNOSIS — S00.211A ABRASION OF RIGHT EYELID AND PERIOCULAR AREA, INITIAL ENCOUNTER: ICD-10-CM
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
## 2022-04-28 PROCEDURE — 70450 CT HEAD/BRAIN W/O DYE: CPT | Mod: MG

## 2022-04-28 PROCEDURE — 70450 CT HEAD/BRAIN W/O DYE: CPT | Mod: 26,MG

## 2022-04-28 PROCEDURE — 70486 CT MAXILLOFACIAL W/O DYE: CPT | Mod: MG

## 2022-04-28 PROCEDURE — G1004: CPT

## 2022-04-28 PROCEDURE — 70486 CT MAXILLOFACIAL W/O DYE: CPT | Mod: 26,MG

## 2022-04-28 PROCEDURE — 71046 X-RAY EXAM CHEST 2 VIEWS: CPT | Mod: 26

## 2022-04-28 PROCEDURE — 99284 EMERGENCY DEPT VISIT MOD MDM: CPT | Mod: 25

## 2022-04-28 PROCEDURE — 72125 CT NECK SPINE W/O DYE: CPT | Mod: MG

## 2022-04-28 PROCEDURE — 71046 X-RAY EXAM CHEST 2 VIEWS: CPT

## 2022-04-28 PROCEDURE — 72125 CT NECK SPINE W/O DYE: CPT | Mod: 26,MG

## 2022-04-28 PROCEDURE — 99284 EMERGENCY DEPT VISIT MOD MDM: CPT

## 2022-04-28 RX ORDER — ACETAMINOPHEN 500 MG
650 TABLET ORAL ONCE
Refills: 0 | Status: COMPLETED | OUTPATIENT
Start: 2022-04-28 | End: 2022-04-28

## 2022-04-28 RX ORDER — BACITRACIN ZINC 500 UNIT/G
1 OINTMENT IN PACKET (EA) TOPICAL ONCE
Refills: 0 | Status: COMPLETED | OUTPATIENT
Start: 2022-04-28 | End: 2022-04-28

## 2022-04-28 RX ORDER — TETANUS TOXOID, REDUCED DIPHTHERIA TOXOID AND ACELLULAR PERTUSSIS VACCINE, ADSORBED 5; 2.5; 8; 8; 2.5 [IU]/.5ML; [IU]/.5ML; UG/.5ML; UG/.5ML; UG/.5ML
0.5 SUSPENSION INTRAMUSCULAR ONCE
Refills: 0 | Status: DISCONTINUED | OUTPATIENT
Start: 2022-04-28 | End: 2022-04-28

## 2022-04-28 RX ADMIN — Medication 650 MILLIGRAM(S): at 11:58

## 2022-04-28 RX ADMIN — Medication 650 MILLIGRAM(S): at 10:57

## 2022-04-28 RX ADMIN — Medication 1 APPLICATION(S): at 12:02

## 2022-04-28 NOTE — ED PROVIDER NOTE - PHYSICAL EXAMINATION
NAD  EOMI, small amount of swelling around r eye, + linear abrasion vs superificial laceration  airway patent  +S1S2 no mrg  CTA b/l  soft nt nd  no le edema;  normal mood and affect, pleasant NAD  EOMI, small amount of swelling around r eye, + linear abrasion vs superificial laceration  airway patent  +S1S2 no mrg  CTA b/l  soft nt nd  no le edema;  normal mood and affect, pleasant  unable to assess visual acuity per pt's baseline mental status

## 2022-04-28 NOTE — ED PROVIDER NOTE - IV ALTEPLASE ADMIN OUTSIDE HIDDEN
Phone Call:    D) Pt. called in this date re: due to work schedule being unable to attend group this date and requesting to be excused for that reason. He also request to make up missed group session with a individual session this week. Counselor and Pt. agreed to follow up this week with a individual session.     Staff Name and Title: IVANA Gibson  Date:  5/26/2020  Time:  5:23 PM    
show

## 2022-04-28 NOTE — ED PROVIDER NOTE - PATIENT PORTAL LINK FT
You can access the FollowMyHealth Patient Portal offered by [facility] by registering at the following website: [email or web address]. By joining [employer]’s FollowMyHealth portal, you will also be able to view your health information using other applications (apps) compatible with our system.

## 2022-04-28 NOTE — ED PROVIDER NOTE - PROGRESS NOTE DETAILS
wound cleaned with betadine, no definite laceration- well approximated, no gaping skin, r adjacent to eyelid more likely derma-bound will irritate patent, recommend bacitracin, bandaid and follow up with pcp tomorrow for repeat exam, repeat neuro exam- pt at baseline, upset she is in the ed, wants to go home

## 2022-04-28 NOTE — ED PROVIDER NOTE - OBJECTIVE STATEMENT
57f Down's syndrome and Discoid Lupus p/w unwitnessed fall yesterday     BIBEMS-- as per aid at group home, patient with unwitnessed fall yesterday--- +bruising and lac noted to R eye-- aid denies blood thinners-- patient presents ambulatory, hx of CP 57f Down's syndrome and Discoid Lupus p/w unwitnessed fall yesterday night, has bruising and abrasion  to R eye-lid. no bloodthinners. behaving like self since. pt non-verbal. history per aid. pt able to ambulate with steady gait.

## 2022-04-28 NOTE — ED PROVIDER NOTE - NSFOLLOWUPINSTRUCTIONS_ED_ALL_ED_FT
FOLLOW UP WITH PRIMARY CARE IN 1-2 days for repeat exam. if any bleeding, redness or purulent drainage from wound

## 2022-04-28 NOTE — ED ADULT NURSE NOTE - NSIMPLEMENTINTERV_GEN_ALL_ED
Implemented All Fall Risk Interventions:  [place] to call system. Call bell, personal items and telephone within reach. Instruct patient to call for assistance. Room bathroom lighting operational. Non-slip footwear when patient is off stretcher. Physically safe environment: no spills, clutter or unnecessary equipment. Stretcher in lowest position, wheels locked, appropriate side rails in place. Provide visual cue, wrist band, yellow gown, etc. Monitor gait and stability. Monitor for mental status changes and reorient to person, place, and time. Review medications for side effects contributing to fall risk. Reinforce activity limits and safety measures with patient and family.

## 2022-04-28 NOTE — ED ADULT TRIAGE NOTE - OTHER COMPLAINTS
patient BIBEMS-- as per aid at group home, patient with unwitnessed fall yesterday--- +bruising and lac noted to R eye-- aid denies blood thinners-- patient presents ambulatory, hx of CP

## 2022-04-28 NOTE — ED PROVIDER NOTE - CLINICAL SUMMARY MEDICAL DECISION MAKING FREE TEXT BOX
unwitnessed fall, eye swelling around r eye, pt non-verbal, appears to be behaving appropriate, has h/o downs- no definitie ttp to neck, chest, ambulates with steady gait, perrla, will get ct to r/o fx, pain control, clean wound, re-assess

## 2022-04-28 NOTE — ED ADULT NURSE NOTE - OBJECTIVE STATEMENT
58 y/o female presented to ed s/p  unwitnessed fall yesterday .Bruising and lac noted to R eye. Pt has hx of CP . Md ibanez pending

## 2023-08-11 ENCOUNTER — EMERGENCY (EMERGENCY)
Facility: HOSPITAL | Age: 58
LOS: 1 days | Discharge: HOME | End: 2023-08-11
Attending: EMERGENCY MEDICINE | Admitting: EMERGENCY MEDICINE
Payer: MEDICARE

## 2023-08-11 VITALS
OXYGEN SATURATION: 95 % | RESPIRATION RATE: 16 BRPM | SYSTOLIC BLOOD PRESSURE: 124 MMHG | DIASTOLIC BLOOD PRESSURE: 87 MMHG | TEMPERATURE: 99 F | HEART RATE: 94 BPM

## 2023-08-11 DIAGNOSIS — Z23 ENCOUNTER FOR IMMUNIZATION: ICD-10-CM

## 2023-08-11 DIAGNOSIS — Z91.018 ALLERGY TO OTHER FOODS: ICD-10-CM
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
## 2023-08-11 DIAGNOSIS — S01.01XA LACERATION WITHOUT FOREIGN BODY OF SCALP, INITIAL ENCOUNTER: ICD-10-CM
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
## 2023-08-11 DIAGNOSIS — Q90.9 DOWN SYNDROME, UNSPECIFIED: ICD-10-CM

## 2023-08-11 PROCEDURE — 90471 IMMUNIZATION ADMIN: CPT

## 2023-08-11 PROCEDURE — 99284 EMERGENCY DEPT VISIT MOD MDM: CPT | Mod: 25

## 2023-08-11 PROCEDURE — 90715 TDAP VACCINE 7 YRS/> IM: CPT

## 2023-08-11 PROCEDURE — 12001 RPR S/N/AX/GEN/TRNK 2.5CM/<: CPT

## 2023-08-11 PROCEDURE — 99283 EMERGENCY DEPT VISIT LOW MDM: CPT | Mod: 25

## 2023-08-11 RX ORDER — TETANUS TOXOID, REDUCED DIPHTHERIA TOXOID AND ACELLULAR PERTUSSIS VACCINE, ADSORBED 5; 2.5; 8; 8; 2.5 [IU]/.5ML; [IU]/.5ML; UG/.5ML; UG/.5ML; UG/.5ML
0.5 SUSPENSION INTRAMUSCULAR ONCE
Refills: 0 | Status: COMPLETED | OUTPATIENT
Start: 2023-08-11 | End: 2023-08-11

## 2023-08-11 RX ADMIN — TETANUS TOXOID, REDUCED DIPHTHERIA TOXOID AND ACELLULAR PERTUSSIS VACCINE, ADSORBED 0.5 MILLILITER(S): 5; 2.5; 8; 8; 2.5 SUSPENSION INTRAMUSCULAR at 21:32

## 2023-08-11 NOTE — ED PROVIDER NOTE - CLINICAL SUMMARY MEDICAL DECISION MAKING FREE TEXT BOX
58F with a hx of Down's Syndrome who p/w aide after a fall, slipped and bumped her head on sink, no LOC, no neuro deficits, small superficial lac to left scalp, no active bleeding, cleaned and repaired with 3 staples, very low suspicion for ICH thus no CT ordered. tdap given. Pt is not on blood thinners.   Wound care instructions given and pt now stable for DC with aide back to home.

## 2023-08-11 NOTE — ED ADULT TRIAGE NOTE - CHIEF COMPLAINT QUOTE
Pt brought in from residence with aide reporting mechanical fall with +head injury, laceration to left side of head. Denies LOC, not on blood thinners. No other complaints s/p fall.

## 2023-08-11 NOTE — ED ADULT NURSE NOTE - OBJECTIVE STATEMENT
58y F, hx of Down's Syndrome, pt brought in from residence with aide reporting mechanical fall with head injury, laceration to left side of head noted. Mental status at baseline as per caregiver. Denies LOC, not on blood thinners. No other complaints s/p fall.

## 2023-08-11 NOTE — ED PROVIDER NOTE - OBJECTIVE STATEMENT
58F with a hx of Down's Syndrome who p/w aide after a fall. Pt was with aide in bathroom getting washed when she slipped and bumped the left side of her head on the sink, no LOC, no seizure activity, pt is acting her baseline self, normal appetite, walking normally, BYRNE x 4, no vomits, no other injury. Unknown tetanus.

## 2023-08-11 NOTE — ED PROVIDER NOTE - NSFOLLOWUPINSTRUCTIONS_ED_ALL_ED_FT
1. You were seen for scalp laceration. This was cleaned and repaired with 3 staples. Please clean it daily with mild soap and warm water, apply bacitracin. Please see you doctor or return to the ER to remove staples in one week.   2. Continue to take your home medications as prescribed.   3. Please follow up with your primary care physician. You may call our referrals coordinator at [phone] Monday to Friday 11am-7pm for assistance with making an appointment. Or you can call [phone] to make an appointment.  4. Return to the emergency department for new, persistent, or worsening symptoms or signs, or for any concerning symptoms.   5. For your for health, you should make healthy food choices and be physically active. Also, you should not smoke or use drugs, and you should not drink alcohol in excess. Please visit [email or web address]/healthyliving for resources and more information.    Laceration    A laceration is a cut that goes through all of the layers of the skin and into the tissue that is right under the skin. Some lacerations heal on their own. Others need to be closed with skin adhesive strips, skin glue, stitches (sutures), or staples. Proper laceration care minimizes the risk of infection and helps the laceration to heal better.  If non-absorbable stitches or staples have been placed, they must be taken out within the time frame instructed by your healthcare provider.    SEEK IMMEDIATE MEDICAL CARE IF YOU HAVE ANY OF THE FOLLOWING SYMPTOMS: swelling around the wound, worsening pain, drainage from the wound, red streaking going away from your wound, inability to move finger or toe near the laceration, or discoloration of skin near the laceration.

## 2023-08-11 NOTE — ED ADULT NURSE NOTE - NSFALLRISKINTERV_ED_ALL_ED

## 2023-08-11 NOTE — ED PROVIDER NOTE - PHYSICAL EXAMINATION
GEN: Well appearing, well developed, petite, awake, alert, oriented, Down's Syndrome. NTAF  ENT: Airway patent, Nasal mucosa clear. Mouth with normal mucosa.  EYES: Clear bilaterally. PERRL, EOMI  RESPIRATORY: Breathing comfortably with normal RR. No W/C/R, no hypoxia or resp distress.  CARDIAC: Regular rate and rhythm, no M/R/G  ABDOMEN: Soft, nontender, +bowel sounds, no rebound, rigidity, or guarding.  MSK: Range of motion is not limited, no deformities noted.  NEURO: Alert and awake, BYRNE x 4, ambulatory, no focal neuro deficits.   Head: left scalp laceration 2cm, no active bleeding, no FB  SKIN: Skin normal color for race, warm, dry and intact. No evidence of rash.

## 2023-08-11 NOTE — ED PROVIDER NOTE - PATIENT PORTAL LINK FT
You can access the FollowMyHealth Patient Portal offered by [facility] by registering at the following website: [email or web address]. By joining [employer]’s FollowMyHealth portal, you will also be able to view your health information using other applications (apps) compatible with our system.

## 2023-08-11 NOTE — ED ADULT NURSE NOTE - NS_ED_NURSE_TEACHING_TOPIC_ED_A_ED
follow-up appt and home care/Other specify I have personally performed a face to face diagnostic evaluation on this patient. I have reviewed the ACP note and agree with the history, exam and plan of care, except as noted.

## 2023-08-18 ENCOUNTER — EMERGENCY (EMERGENCY)
Facility: HOSPITAL | Age: 58
LOS: 1 days | Discharge: HOME | End: 2023-08-18
Admitting: EMERGENCY MEDICINE
Payer: MEDICARE

## 2023-08-18 VITALS
RESPIRATION RATE: 17 BRPM | TEMPERATURE: 98 F | OXYGEN SATURATION: 95 % | DIASTOLIC BLOOD PRESSURE: 61 MMHG | SYSTOLIC BLOOD PRESSURE: 94 MMHG | HEART RATE: 96 BPM

## 2023-08-18 PROCEDURE — L9995: CPT

## 2023-08-18 PROCEDURE — 99212 OFFICE O/P EST SF 10 MIN: CPT

## 2023-08-18 NOTE — ED PROVIDER NOTE - CLINICAL SUMMARY MEDICAL DECISION MAKING FREE TEXT BOX
pt returns for uncomplicated staple removal, no signs of inf, removed w/o difficulty, no further emergent care indicated, to f/u w/pmd as needed

## 2023-08-18 NOTE — ED ADULT NURSE NOTE - OBJECTIVE STATEMENT
58y female presents to ED c/o staple removal. Pt had staples placed in scalp one week ago after fall. Denies fever/chills. No purulent discharge noted.

## 2023-08-18 NOTE — ED PROVIDER NOTE - EYES, MLM
I personally evaluated the patient. I reviewed the Resident’s or Physician Assistant’s note (as assigned above), and agree with the findings and plan except as documented in my note. patient labs and imaging unremarkable, patient's family would like patient observed longer, will place in edou for further eval. Continues to deny cp/sob, no n/v/d Clear bilaterally, pupils equal, round and reactive to light.

## 2023-08-18 NOTE — ED PROVIDER NOTE - NSFOLLOWUPINSTRUCTIONS_ED_ALL_ED_FT
Wound Closure Removal, Care After  The following information offers guidance on how to care for yourself after your stitches (sutures), staples, or adhesive strips have been removed. Your health care provider may also give you more specific instructions. If you have problems or questions, contact your health care provider.  What can I expect after the procedure?  After your sutures or staples have been removed or your adhesive strips have fallen off, it is common to have:  Some discomfort and swelling in the area.  Slight redness in the area.  Follow these instructions at home:  If you have a dressing:  Wash your hands with soap and water for at least 20 seconds before and after you change your bandage (dressing). If soap and water are not available, use hand .  Change your dressing as told by your health care provider. If your dressing becomes wet or dirty, or develops a bad smell, change it as soon as possible.  If your dressing sticks to your skin, pour warm, clean water over it until it loosens and can be removed without pulling apart the wound edges. Pat the area dry with a soft, clean towel. Do not rub the wound because that may cause bleeding.  Wound care  Washing hands with soap and water.  Check your wound every day for signs of infection. Check for:  More redness, swelling, or pain.  Fluid or blood.  New warmth, a rash, or hardness at the wound site.  Pus or a bad smell.  Wash your hands with soap and water for at least 20 seconds before and after touching your wound. If soap and water are not available, use hand .  Keep the wound area dry and clean. Clean and pat the wound dry as told by your health care provider.  Apply cream or ointment only as told by your health care provider.  If skin glue or adhesive strips were applied after sutures or staples were removed, leave these closures in place until they peel off on their own. If adhesive strip edges start to loosen and curl up, you may trim the loose edges. Do not remove adhesive strips completely unless your health care provider tells you to do that.  Continue to protect the wound from injury.  Do not pick at your wound. Picking can cause an infection.  Bathing  Do not take baths, swim, or use a hot tub until your health care provider approves. Ask your health care provider if you may take showers.  Follow these steps for showering:  If you have a dressing, remove it before getting into the shower.  In the shower, allow soapy water to get on the wound. Avoid scrubbing the wound.  When you get out of the shower, dry the wound by patting it with a clean towel.  Reapply a dressing over the wound, if needed.  Scar care  When your wound has completely healed, help decrease the size of your scar by:  Wearing sunscreen over the scar or covering it with clothing when you are outside. New scars get sunburned easily, which can make scarring worse.  Gently massaging the scarred area. This can decrease scar thickness.  General instructions  Take over-the-counter and prescription medicines only as told by your health care provider.  Keep all follow-up visits. This is important.  Contact a health care provider if:  You have more redness, swelling, or pain around your wound.  You have fluid or blood coming from your wound.  You have new warmth, a rash, or hardness at the wound site.  You have pus or a bad smell coming from your wound.  Your wound opens up.  Get help right away if:  You have a fever or chills.  You have red streaks coming from your wound.  Summary  Change your dressing as told by your health care provider. If your dressing becomes wet or dirty, or develops a bad smell, change it as soon as possible.  Check your wound every day for signs of infection.  Wash your hands with soap and water for 20 seconds before and after touching your wound.

## 2023-08-18 NOTE — ED PROVIDER NOTE - PATIENT PORTAL LINK FT
You can access the FollowMyHealth Patient Portal offered by [facility] by registering at the following website: [email or web address]. By joining [employer]’s FollowMyHealth portal, you will also be able to view your health information using other applications (apps) compatible with our system.

## 2023-08-18 NOTE — ED ADULT NURSE NOTE - NSFALLUNIVINTERV_ED_ALL_ED
Bed/Stretcher in lowest position, wheels locked, appropriate side rails in place/Call bell, personal items and telephone in reach/Instruct patient to call for assistance before getting out of bed/chair/stretcher/Non-slip footwear applied when patient is off stretcher/[place] to call system/Physically safe environment - no spills, clutter or unnecessary equipment/Purposeful proactive rounding/Room/bathroom lighting operational, light cord in reach

## 2023-08-18 NOTE — ED PROVIDER NOTE - OBJECTIVE STATEMENT
The pt is a 57 y/o F, who is brought in by [facility] for staple removal s/p repair 1 wk ago. No pus, no bleeding, no fevers.
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
## 2023-08-21 DIAGNOSIS — S01.01XD LACERATION WITHOUT FOREIGN BODY OF SCALP, SUBSEQUENT ENCOUNTER: ICD-10-CM

## 2024-07-30 ENCOUNTER — EMERGENCY (EMERGENCY)
Facility: HOSPITAL | Age: 59
LOS: 1 days | Discharge: HOME | End: 2024-07-30
Attending: EMERGENCY MEDICINE | Admitting: EMERGENCY MEDICINE
Payer: MEDICARE

## 2024-07-30 VITALS
RESPIRATION RATE: 18 BRPM | DIASTOLIC BLOOD PRESSURE: 89 MMHG | OXYGEN SATURATION: 94 % | HEART RATE: 78 BPM | SYSTOLIC BLOOD PRESSURE: 134 MMHG

## 2024-07-30 VITALS
RESPIRATION RATE: 18 BRPM | OXYGEN SATURATION: 98 % | TEMPERATURE: 98 F | SYSTOLIC BLOOD PRESSURE: 106 MMHG | HEART RATE: 82 BPM | DIASTOLIC BLOOD PRESSURE: 60 MMHG

## 2024-07-30 PROCEDURE — 99283 EMERGENCY DEPT VISIT LOW MDM: CPT | Mod: FS

## 2024-07-30 PROCEDURE — 99282 EMERGENCY DEPT VISIT SF MDM: CPT

## 2024-07-30 NOTE — ED PROVIDER NOTE - PATIENT PORTAL LINK FT
You can access the FollowMyHealth Patient Portal offered by [facility] by registering at the following website: [email or web address]. By joining [employer]’s FollowMyHealth portal, you will also be able to view your health information using other applications (apps) compatible with our system.

## 2024-07-30 NOTE — ED ADULT TRIAGE NOTE - CHIEF COMPLAINT QUOTE
BIBA from an assisted living facility. The care giver heard a thump in pt's room and found pt. on the floor b/n closet and bed.

## 2024-07-30 NOTE — ED ADULT NURSE NOTE - NSFALLRISKINTERV_ED_ALL_ED
Assistance OOB with selected safe patient handling equipment if applicable/Assistance with ambulation/Communicate fall risk and risk factors to all staff, patient, and family/Monitor gait and stability/Monitor for mental status changes and reorient to person, place, and time, as needed/Move patient closer to nursing station/within visual sight of ED staff/Provide visual cue: yellow wristband, yellow gown, etc/Reinforce activity limits and safety measures with patient and family/Toileting schedule using arm’s reach rule for commode and bathroom/Use of alarms - bed, stretcher, chair and/or video monitoring/Call bell, personal items and telephone in reach/Instruct patient to call for assistance before getting out of bed/chair/stretcher/Non-slip footwear applied when patient is off stretcher/[place] to call system/Physically safe environment - no spills, clutter or unnecessary equipment/Purposeful Proactive Rounding/Room/bathroom lighting operational, light cord in reach

## 2024-07-30 NOTE — ED PROVIDER NOTE - OBJECTIVE STATEMENT
60 y/o blind F with PMHx down syndrome, discoid lupus, BIBEMS for fall at 04:30 this morning. Patient's caretaker from  [employer] present at bedside providing story. As per patient's aide, while she was in the kitchen at around 04:30, she heard a "thump" coming from the patient's bedroom. Per aide, patient is able to ambulate on her own and sometimes gets out of bed at night without them hearing her. Upon arrival to the room, found patient face down between bed and dresser. Immediately called 911. Patient had associated nosebleed, which self-resolved over the course of an hour. Patient denies being in pain. As per patient's aide, she is at her baseline, responding in one word answers and smiling. 60 y/o blind F with PMHx down syndrome, discoid lupus, BIBEMS for fall at 04:30 this morning. Patient's caretaker from  [employer] present at bedside providing story. As per patient's aide, while she was in the kitchen at around 04:30, she heard a "thump" coming from the patient's bedroom. Per aide, patient is able to ambulate on her own and sometimes gets out of bed at night without them hearing her. Upon arrival to the room, found patient face down between bed and dresser. Immediately called 911.  Denies prolonged downtime.  Patient had associated nosebleed, which self-resolved over the course of an hour. Patient denies being in pain. States "feels fine." Aide who knows well states pt is at her baseline, responding in one word answers and smiling.  States pt has been well recently, eating/drinking normally, using bathroom normally.  Denies asa/ ac use.  Aid denies seizure activity or vomiting.

## 2024-07-30 NOTE — ED PROVIDER NOTE - ATTENDING APP SHARED VISIT CONTRIBUTION OF CARE
Unwitnessed fall from bed, hx Down's syndrome, bloody nose, acting nl at baseline, no vomiting.  Nl appearing on exam, VSS, all bony prominences palpated and nontender.  Dried blood in both nares, no septal hematoma or ttp or nasal bone.  No signs of trauma.  No scalp hematoma.  Given above, plan reassurance and outpt monitoring.  DO not suspect ich or other injury.

## 2024-07-30 NOTE — ED ADULT NURSE NOTE - OBJECTIVE STATEMENT
Pt presents s/p fall. Per health attendant, "heard thump and found patient on the floor between bed and closet". Pt also found with nosebleed. Pt nonverbal so unable to determine exact history.

## 2024-07-30 NOTE — ED PROVIDER NOTE - CLINICAL SUMMARY MEDICAL DECISION MAKING FREE TEXT BOX
60 y/o blind F with PMHx down syndrome, discoid lupus, BIBEMS for fall at 04:30 this morning. Patient's caretaker from  [employer] present at bedside providing story. As per patient's aide, while she was in the kitchen at around 04:30, she heard a "thump" coming from the patient's bedroom. Per aide, patient is able to ambulate on her own and sometimes gets out of bed at night without them hearing her. Upon arrival to the room, found patient face down between bed and dresser. Immediately called 911.  Denies prolonged downtime.  Patient had associated nosebleed, which self-resolved over the course of an hour. Patient denies being in pain. States "feels fine." Aide who knows well states pt is at her baseline, responding in one word answers and smiling.  States pt has been well recently, eating/drinking normally, using bathroom normally.  Denies asa/ ac use.  Aid denies seizure activity or vomiting.     VSS  Exam notable for dried blood in nares (L>R).  No ttp to face/ bridge of nose.  No other signs trauma on body.     Overall low suspicion for acute traumatic injury at this time.  Low suspicion for ICH as pt with no seizure activity, no vomiting, and baseline mental status.  No scalp hematoma, raccoon eyes, keller signs.  Will get screening EKG and DC with aide with strict return precautions, f/u pmd 60 y/o blind F with PMHx down syndrome, discoid lupus, BIBEMS for fall at 04:30 this morning. Patient's caretaker from  [employer] present at bedside providing story. As per patient's aide, while she was in the kitchen at around 04:30, she heard a "thump" coming from the patient's bedroom. Per aide, patient is able to ambulate on her own and sometimes gets out of bed at night without them hearing her. Upon arrival to the room, found patient face down between bed and dresser. Immediately called 911.  Denies prolonged downtime.  Patient had associated nosebleed, which self-resolved over the course of an hour. Patient denies being in pain. States "feels fine." Aide who knows well states pt is at her baseline, responding in one word answers and smiling.  States pt has been well recently, eating/drinking normally, using bathroom normally.  Denies asa/ ac use.  Aid denies seizure activity or vomiting.     VSS  Exam notable for dried blood in nares (L>R).  No ttp to face/ bridge of nose.  No other signs trauma on body.     Overall low suspicion for acute traumatic injury at this time.  Low suspicion for underlying event causing fall, as aide states pt frequently gets up and walks on own in middle of night.  Low suspicion for ICH as pt with no seizure activity, no vomiting, and baseline mental status.  No scalp hematoma, raccoon eyes, keller signs.  Will get screening EKG and DC with aide with strict return precautions, f/u pmd

## 2024-08-02 DIAGNOSIS — Z04.3 ENCOUNTER FOR EXAMINATION AND OBSERVATION FOLLOWING OTHER ACCIDENT: ICD-10-CM

## 2024-08-02 DIAGNOSIS — Z91.018 ALLERGY TO OTHER FOODS: ICD-10-CM
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
## 2024-08-02 DIAGNOSIS — Q90.9 DOWN SYNDROME, UNSPECIFIED: ICD-10-CM
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
## 2024-12-10 NOTE — ED PROVIDER NOTE - DISPOSITION TYPE
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  Plantar plate injury, left (S93.922S).  Treatment: It has been a year.  Discussed options, including surgical, injections, which have not been very successful.  I feel that another MRI is warranted and an MRI with contrast was ordered in order to evaluate the neuroma and the potential for healing of the plantar plate.  Await those results.   Any questions or problems, patient is to contact the office. DISCHARGE

## 2025-04-11 ENCOUNTER — INPATIENT (INPATIENT)
Facility: HOSPITAL | Age: 60
LOS: 2 days | Discharge: HOME | End: 2025-04-14
Attending: STUDENT IN AN ORGANIZED HEALTH CARE EDUCATION/TRAINING PROGRAM | Admitting: INTERNAL MEDICINE
Payer: MEDICARE

## 2025-04-11 VITALS
HEIGHT: 55 IN | TEMPERATURE: 97 F | HEART RATE: 104 BPM | DIASTOLIC BLOOD PRESSURE: 74 MMHG | OXYGEN SATURATION: 95 % | SYSTOLIC BLOOD PRESSURE: 104 MMHG | RESPIRATION RATE: 22 BRPM | WEIGHT: 69 LBS

## 2025-04-11 LAB
ADD ON TEST-SPECIMEN IN LAB: SIGNIFICANT CHANGE UP
ADD ON TEST-SPECIMEN IN LAB: SIGNIFICANT CHANGE UP
ANION GAP SERPL CALC-SCNC: 10 MMOL/L — SIGNIFICANT CHANGE UP (ref 5–17)
ANION GAP SERPL CALC-SCNC: 10 MMOL/L — SIGNIFICANT CHANGE UP (ref 5–17)
APTT BLD: 22.3 SEC — LOW (ref 24.5–35.6)
BASOPHILS # BLD AUTO: 0.08 K/UL — SIGNIFICANT CHANGE UP (ref 0–0.2)
BASOPHILS NFR BLD AUTO: 0.9 % — SIGNIFICANT CHANGE UP (ref 0–2)
BUN SERPL-MCNC: 16 MG/DL — SIGNIFICANT CHANGE UP (ref 7–23)
BUN SERPL-MCNC: 21 MG/DL — SIGNIFICANT CHANGE UP (ref 7–23)
CALCIUM SERPL-MCNC: 10 MG/DL — SIGNIFICANT CHANGE UP (ref 8.4–10.5)
CALCIUM SERPL-MCNC: 8.5 MG/DL — SIGNIFICANT CHANGE UP (ref 8.4–10.5)
CHLORIDE SERPL-SCNC: 106 MMOL/L — SIGNIFICANT CHANGE UP (ref 96–108)
CHLORIDE SERPL-SCNC: 110 MMOL/L — HIGH (ref 96–108)
CO2 SERPL-SCNC: 23 MMOL/L — SIGNIFICANT CHANGE UP (ref 22–31)
CO2 SERPL-SCNC: 30 MMOL/L — SIGNIFICANT CHANGE UP (ref 22–31)
CREAT SERPL-MCNC: 0.74 MG/DL — SIGNIFICANT CHANGE UP (ref 0.5–1.3)
CREAT SERPL-MCNC: 0.75 MG/DL — SIGNIFICANT CHANGE UP (ref 0.5–1.3)
CRP SERPL-MCNC: 23.4 MG/L — HIGH (ref 0–4)
EGFR: 91 ML/MIN/1.73M2 — SIGNIFICANT CHANGE UP
EGFR: 91 ML/MIN/1.73M2 — SIGNIFICANT CHANGE UP
EGFR: 92 ML/MIN/1.73M2 — SIGNIFICANT CHANGE UP
EGFR: 92 ML/MIN/1.73M2 — SIGNIFICANT CHANGE UP
EOSINOPHIL # BLD AUTO: 0.28 K/UL — SIGNIFICANT CHANGE UP (ref 0–0.5)
EOSINOPHIL NFR BLD AUTO: 3.2 % — SIGNIFICANT CHANGE UP (ref 0–6)
GLUCOSE SERPL-MCNC: 208 MG/DL — HIGH (ref 70–99)
GLUCOSE SERPL-MCNC: 99 MG/DL — SIGNIFICANT CHANGE UP (ref 70–99)
HCT VFR BLD CALC: 42.1 % — SIGNIFICANT CHANGE UP (ref 34.5–45)
HGB BLD-MCNC: 13.9 G/DL — SIGNIFICANT CHANGE UP (ref 11.5–15.5)
IMM GRANULOCYTES NFR BLD AUTO: 0.5 % — SIGNIFICANT CHANGE UP (ref 0–0.9)
INR BLD: 1.19 — HIGH (ref 0.85–1.16)
LYMPHOCYTES # BLD AUTO: 0.66 K/UL — LOW (ref 1–3.3)
LYMPHOCYTES # BLD AUTO: 7.6 % — LOW (ref 13–44)
MCHC RBC-ENTMCNC: 32.2 PG — SIGNIFICANT CHANGE UP (ref 27–34)
MCHC RBC-ENTMCNC: 33 G/DL — SIGNIFICANT CHANGE UP (ref 32–36)
MCV RBC AUTO: 97.5 FL — SIGNIFICANT CHANGE UP (ref 80–100)
MONOCYTES # BLD AUTO: 0.69 K/UL — SIGNIFICANT CHANGE UP (ref 0–0.9)
MONOCYTES NFR BLD AUTO: 8 % — SIGNIFICANT CHANGE UP (ref 2–14)
NEUTROPHILS # BLD AUTO: 6.91 K/UL — SIGNIFICANT CHANGE UP (ref 1.8–7.4)
NEUTROPHILS NFR BLD AUTO: 79.8 % — HIGH (ref 43–77)
NRBC BLD AUTO-RTO: 0 /100 WBCS — SIGNIFICANT CHANGE UP (ref 0–0)
PLATELET # BLD AUTO: 310 K/UL — SIGNIFICANT CHANGE UP (ref 150–400)
POTASSIUM SERPL-MCNC: 4.4 MMOL/L — SIGNIFICANT CHANGE UP (ref 3.5–5.3)
POTASSIUM SERPL-MCNC: 4.5 MMOL/L — SIGNIFICANT CHANGE UP (ref 3.5–5.3)
POTASSIUM SERPL-SCNC: 4.4 MMOL/L — SIGNIFICANT CHANGE UP (ref 3.5–5.3)
POTASSIUM SERPL-SCNC: 4.5 MMOL/L — SIGNIFICANT CHANGE UP (ref 3.5–5.3)
PROTHROM AB SERPL-ACNC: 13.9 SEC — HIGH (ref 9.9–13.4)
RBC # BLD: 4.32 M/UL — SIGNIFICANT CHANGE UP (ref 3.8–5.2)
RBC # FLD: 15.4 % — HIGH (ref 10.3–14.5)
SODIUM SERPL-SCNC: 139 MMOL/L — SIGNIFICANT CHANGE UP (ref 135–145)
SODIUM SERPL-SCNC: 150 MMOL/L — HIGH (ref 135–145)
WBC # BLD: 8.66 K/UL — SIGNIFICANT CHANGE UP (ref 3.8–10.5)
WBC # FLD AUTO: 8.66 K/UL — SIGNIFICANT CHANGE UP (ref 3.8–10.5)

## 2025-04-11 PROCEDURE — 0042T: CPT

## 2025-04-11 PROCEDURE — 70496 CT ANGIOGRAPHY HEAD: CPT | Mod: 26

## 2025-04-11 PROCEDURE — 99291 CRITICAL CARE FIRST HOUR: CPT

## 2025-04-11 PROCEDURE — 70450 CT HEAD/BRAIN W/O DYE: CPT | Mod: 26,XU

## 2025-04-11 PROCEDURE — 99282 EMERGENCY DEPT VISIT SF MDM: CPT | Mod: 25

## 2025-04-11 PROCEDURE — 70498 CT ANGIOGRAPHY NECK: CPT | Mod: 26

## 2025-04-11 PROCEDURE — 31575 DIAGNOSTIC LARYNGOSCOPY: CPT

## 2025-04-11 RX ORDER — METHYLPREDNISOLONE ACETATE 80 MG/ML
125 INJECTION, SUSPENSION INTRA-ARTICULAR; INTRALESIONAL; INTRAMUSCULAR; SOFT TISSUE ONCE
Refills: 0 | Status: COMPLETED | OUTPATIENT
Start: 2025-04-11 | End: 2025-04-11

## 2025-04-11 RX ORDER — ACETAMINOPHEN 500 MG/5ML
475 LIQUID (ML) ORAL ONCE
Refills: 0 | Status: COMPLETED | OUTPATIENT
Start: 2025-04-11 | End: 2025-04-11

## 2025-04-11 RX ORDER — KETAMINE HCL IN 0.9 % NACL 50 MG/5 ML
40 SYRINGE (ML) INTRAVENOUS ONCE
Refills: 0 | Status: DISCONTINUED | OUTPATIENT
Start: 2025-04-11 | End: 2025-04-11

## 2025-04-11 RX ORDER — DIPHENHYDRAMINE HCL 12.5MG/5ML
25 ELIXIR ORAL ONCE
Refills: 0 | Status: COMPLETED | OUTPATIENT
Start: 2025-04-11 | End: 2025-04-11

## 2025-04-11 RX ORDER — SODIUM CHLORIDE 9 G/1000ML
1000 INJECTION, SOLUTION INTRAVENOUS
Refills: 0 | Status: COMPLETED | OUTPATIENT
Start: 2025-04-11 | End: 2025-04-11

## 2025-04-11 RX ADMIN — Medication 190 MILLIGRAM(S): at 18:18

## 2025-04-11 RX ADMIN — Medication 25 MILLIGRAM(S): at 13:07

## 2025-04-11 RX ADMIN — Medication 0.3 MILLIGRAM(S): at 13:13

## 2025-04-11 RX ADMIN — Medication 1000 MILLILITER(S): at 14:34

## 2025-04-11 RX ADMIN — METHYLPREDNISOLONE ACETATE 125 MILLIGRAM(S): 80 INJECTION, SUSPENSION INTRA-ARTICULAR; INTRALESIONAL; INTRAMUSCULAR; SOFT TISSUE at 13:08

## 2025-04-11 RX ADMIN — SODIUM CHLORIDE 1000 MILLILITER(S): 9 INJECTION, SOLUTION INTRAVENOUS at 18:09

## 2025-04-11 RX ADMIN — Medication 475 MILLIGRAM(S): at 18:30

## 2025-04-11 RX ADMIN — Medication 20 MILLIGRAM(S): at 13:08

## 2025-04-11 RX ADMIN — Medication 40 MILLIGRAM(S): at 13:33

## 2025-04-11 NOTE — ED PROVIDER NOTE - NS ED ROS FT
Constitutional: No fever   Eyes: No discharge or drainage  Ears, Nose, Mouth, Throat: + tongue swelling  Cardiovascular: No cyanosis  Respiratory: No shortness of breath, no cough, no wheezing   Gastrointestinal: Novomiting, no abdominal distension, no diarrhea or constipation  Musculoskeletal: No joint swelling  Skin: No rashes or lesions  Neurological: No change in behavior  Endocrine:no weight loss

## 2025-04-11 NOTE — ED ADULT NURSE NOTE - OBJECTIVE STATEMENT
60 y/o female presents to ED with tongue swelling to tip of tongue that presented today per HHA. pt has CP and down syndrome. according to HHA, pt fell 2 weeks ago and fractured her jaw with supposed stitches in her tongue. per pt HHA, pt is normally lethargic during the day and leans to one side when sitting up, but the constant sticking out of the tongue that pt has today is new. no new food or medications. pt normally swallows on her own. allergy to citrus. pt HHA poor historian. pt changed into yellow gown, IV access obtained.

## 2025-04-11 NOTE — CONSULT NOTE ADULT - CRITICAL CARE ATTENDING COMMENT
Angioedema of unclear etiology. Serologic workup as per above. NPO. ENT recs appreciated. Admit to MICU for airway monitoring.

## 2025-04-11 NOTE — ED PROVIDER NOTE - ADMIT DISPOSITION PRESENT ON ADMISSION SEPSIS
RAPID RESPONSE RESPIRATORY CHART CHECK       Chart check completed, call [phone] for further concerns or assistance.      
No

## 2025-04-11 NOTE — CONSULT NOTE ADULT - SUBJECTIVE AND OBJECTIVE BOX
[facility] SERVICE CONSULTATION NOTE    CC:  Tongue swelling    HPI:  58 y/o blind F with PMHx down syndrome, discoid lupus presenting with tongue swelling.  Per aid, pt had fall 2 weeks patient had a fall and went to OSH (unsure which one), had laceration around mouth. States there was subsequent tongue swelling that had since resolved. Per aid, while eating around noon, noticed that patient was spitting up food and tongue appeared swollen and patient was not able to move it or keep her tongue in her mouth when leaning forward- , prompting 911 call. Unable to obtain any history from patient. Per aid, was recently give new eye drop yesterday (does not know which eye drop). Denies known ho anaphlyaxis.     ROS:  Otherwise negative, except as specified in HPI.    PMH:  Down syndrome    ALLERGIES:  Citrus    MEDICATIONS:  Trazodone 100mg qhs  Olanzapine 2.5mg qhs  olopatadine .2% eye drops  tacrolimus .1% oint  ciclopirox 1% shampoo  betameth dip augmentin .05% to scalp  mometasone .1% scalp    VITAL SIGNS:  ICU Vital Signs Last 24 Hrs  T(C): 36.3 (11 Apr 2025 12:53), Max: 36.3 (11 Apr 2025 12:53)  T(F): 97.3 (11 Apr 2025 12:53), Max: 97.3 (11 Apr 2025 12:53)  HR: 110 (11 Apr 2025 15:19) (104 - 122)  BP: 144/85 (11 Apr 2025 15:19) (97/58 - 160/90)  BP(mean): --  ABP: --  ABP(mean): --  RR: 18 (11 Apr 2025 15:19) (16 - 22)  SpO2: 98% (11 Apr 2025 15:19) (93% - 100%)    O2 Parameters below as of 11 Apr 2025 15:19  Patient On (Oxygen Delivery Method): nasal cannula  O2 Flow (L/min): 3    CAPILLARY BLOOD GLUCOSE    PHYSICAL EXAM:  Constitutional: sleeping, contracted, withdraws to pain, drooling and with secretions  HEENT: bilateral chemosis of eyes, macroglossia, + gag reflex  Neck: no lymphadenopathy  Respiratory: no audible stridor, ctab  Cardiovascular: +S1/S2, RRR  Gastrointestinal: abdomen soft, NT/ND  Extremities: WWP cap refill 2 seconds  Vascular: 2+ radial, femoral, and DP/PT pulses B/L  Dermatologic: facial rash    LABS:                        13.9   8.66  )-----------( 310      ( 11 Apr 2025 13:06 )             42.1     04-11    150[H]  |  110[H]  |  21  ----------------------------<  99  4.5   |  30  |  0.75    Ca    10.0      11 Apr 2025 13:06      PT/INR - ( 11 Apr 2025 14:00 )   PT: 13.9 sec;   INR: 1.19       PTT - ( 11 Apr 2025 14:00 )  PTT:22.3 sec    Urinalysis Basic - ( 11 Apr 2025 13:06 )    Color: x / Appearance: x / SG: x / pH: x  Gluc: 99 mg/dL / Ketone: x  / Bili: x / Urobili: x   Blood: x / Protein: x / Nitrite: x   Leuk Esterase: x / RBC: x / WBC x   Sq Epi: x / Non Sq Epi: x / Bacteria: x    EKG: Reviewed.    RADIOLOGY & ADDITIONAL TESTS: Reviewed.

## 2025-04-11 NOTE — PATIENT PROFILE ADULT - FALL HARM RISK - HARM RISK INTERVENTIONS
Assistance OOB with selected safe patient handling equipment/Communicate Risk of Fall with Harm to all staff/Discuss with provider need for PT consult/Monitor for mental status changes/Monitor gait and stability/Move patient closer to nurses' station/Provide patient with walking aids - walker, cane, crutches/Reinforce activity limits and safety measures with patient and family/Reorient to person, place and time as needed/Tailored Fall Risk Interventions/Toileting schedule using arm’s reach rule for commode and bathroom/Use of alarms - bed, chair and/or voice tab/Visual Cue: Yellow wristband and red socks/Bed in lowest position, wheels locked, appropriate side rails in place/Call bell, personal items and telephone in reach/Instruct patient to call for assistance before getting out of bed or chair/Non-slip footwear when patient is out of bed/[place] to call system/Physically safe environment - no spills, clutter or unnecessary equipment/Purposeful Proactive Rounding/Room/bathroom lighting operational, light cord in reach

## 2025-04-11 NOTE — ED PROVIDER NOTE - CRITICAL CARE ATTENDING CONTRIBUTION TO CARE
upon my evaluation, this patient had a high probability of imminent or life-threatening deterioration due to airway compromise which required my direct attention, intervention, and personal management.    I have personally provided 60 minutes of critical care time exclusive of time spent on separately billable procedures. Time includes review of laboratory data, radiology results, discussion with consultants, and monitoring for potential decompensation. Interventions were performed as documented above.

## 2025-04-11 NOTE — ED ADULT NURSE NOTE - NSFALLRISKINTERV_ED_ALL_ED
Assistance OOB with selected safe patient handling equipment if applicable/Assistance with ambulation/Communicate fall risk and risk factors to all staff, patient, and family/Monitor gait and stability/Monitor for mental status changes and reorient to person, place, and time, as needed/Provide visual cue: yellow wristband, yellow gown, etc/Reinforce activity limits and safety measures with patient and family/Toileting schedule using arm’s reach rule for commode and bathroom/Use of alarms - bed, stretcher, chair and/or video monitoring/Call bell, personal items and telephone in reach/Instruct patient to call for assistance before getting out of bed/chair/stretcher/Non-slip footwear applied when patient is off stretcher/[place] to call system/Physically safe environment - no spills, clutter or unnecessary equipment/Purposeful Proactive Rounding/Room/bathroom lighting operational, light cord in reach

## 2025-04-11 NOTE — H&P ADULT - NSHPPHYSICALEXAM_GEN_ALL_CORE
PHYSICAL EXAM:  Constitutional: sleeping, contracted, withdraws to pain, drooling and with secretions  HEENT: bilateral chemosis of eyes, macroglossia, + gag reflex  Neck: no lymphadenopathy  Respiratory: no audible stridor, ctab  Cardiovascular: +S1/S2, RRR  Gastrointestinal: abdomen soft, NT/ND  Extremities: WWP cap refill 2 seconds  Vascular: 2+ radial, femoral, and DP/PT pulses B/L  Dermatologic: facial rash

## 2025-04-11 NOTE — CONSULT NOTE ADULT - ASSESSMENT
Assessment:  60 y/o blind F with PMHx down syndrome, discoid lupus presenting with tongue swelling. ICU consulted for airway monitoring    NEURO  Unknown baseline, though responds to name. Received ketamine in ED. Home med olanzapine 2.5 and trazodone. Stroke code in ED for decrease tongue tone.  - reassess mental status when more awake  - hold home sedatives  - f/u stroke recs    HEENT  #C/f angioedema  #Oropharyngeal swelling  Per health aid, swelling increased from baseline. Received epinephrine and steroids in the ED. ENT scope without concern for swelling of the airway. Given bilateral chemosis and rx of allergic eyedrops, patient may have had exposure to allergen prior to today. In list of home meds, tacrolimus cream for discoid lupus, which may cause angioedema, and could have accidentally ingested. Otherwise patient is in group home, and may have been exposed medication for other patient. Less likely hereditary angioedema, though reasonable to work up. Less likely psychogenic, though patient is on olanzapine, atypical antipsychotic, and low likelihood for tardive dyskinesia.  - f/u C1 esterase inhibtitor, C1Q, C3 and C4 complement  - ent consulted, f/u recs    CARDIO  No active issues.    PULM  #AHRF  Currently on 3L, no episode of hypoxemia, and saturating well at 198 - 100%    GI  #NPO  - q6 finger sticks    RENAL/  #Hypernatremia  Euvolemic, given 1L in the ED. Will recommend to give 1L dextrose  - give d5 1L bolus    ID  No active issues.    HEME/ONC  No active issues.    Rheumatologic  #Discoid lupus  Home meds tacrolimus .1% cream, ciclopirox 1%, betametah dip aug, and mometasone  - hold for now until angioedema resolves    DISPO  F: 1L  E: replenish prn  N: npo  GI PPX: none  DVT PPX: lovenox  DISPO: MICU Assessment:  60 y/o blind F with PMHx down syndrome, discoid lupus presenting with tongue swelling. ICU consulted for airway monitoring    NEURO  Unknown baseline, though responds to name. Received ketamine in ED. Home med olanzapine 2.5 and trazodone. Stroke code in ED for decrease tongue tone.  - reassess mental status when more awake  - hold home sedatives  - f/u stroke recs    HEENT  #C/f angioedema  #Oropharyngeal swelling  Per health aid, swelling increased from baseline. Received epinephrine and steroids in the ED. ENT scope without concern for swelling of the airway. Given bilateral chemosis and rx of allergic eyedrops, patient may have had exposure to allergen prior to today. In list of home meds, tacrolimus cream for discoid lupus, which may cause angioedema, and could have accidentally ingested. Otherwise patient is in group home, and may have been exposed medication for other patient. Less likely hereditary angioedema, though reasonable to work up. Less likely psychogenic, though patient is on olanzapine, atypical antipsychotic, and low likelihood for tardive dyskinesia.  - f/u C1 esterase inhibtitor, C1Q, C3 and C4 complement  - ent consulted, f/u recs  - hold further epinephrine and steroids for now  - monitor airway    CARDIO  No active issues.    PULM  #AHRF  Currently on 3L, no episode of hypoxemia, and saturating well at 198 - 100%    GI  #NPO  - q6 finger sticks    RENAL/  #Hypernatremia  Euvolemic, given 1L in the ED. Will recommend to give 1L dextrose  - give d5 1L bolus    ID  No active issues.    HEME/ONC  No active issues.    Rheumatologic  #Discoid lupus  Home meds tacrolimus .1% cream, ciclopirox 1%, betametah dip aug, and mometasone  - hold for now until angioedema resolves    DISPO  F: 1L  E: replenish prn  N: npo  GI PPX: none  DVT PPX: lovenox  DISPO: MICU Assessment:  60 y/o blind F with PMHx down syndrome, discoid lupus presenting with tongue swelling. ICU consulted for airway monitoring    NEURO  Unknown baseline, though responds to name. Received ketamine in ED. Home med olanzapine 2.5 and trazodone. Stroke code in ED for decrease tongue tone.  - reassess mental status when more awake  - hold home sedatives  - f/u stroke recs    HEENT  #C/f angioedema  #Oropharyngeal swelling  Per health aid, swelling increased from baseline. Received epinephrine and steroids in the ED. ENT scope without concern for swelling of the airway. Given bilateral chemosis and rx of allergic eyedrops, patient may have had exposure to allergen prior to today. In list of home meds, tacrolimus cream for discoid lupus, which may cause angioedema, and could have accidentally ingested. Otherwise patient is in group home, and may have been exposed medication for other patient. Less likely hereditary angioedema, though reasonable to work up. Less likely psychogenic, though patient is on olanzapine, atypical antipsychotic, and low likelihood for tardive dyskinesia.  - f/u C1 esterase inhibtitor, C1Q, C3 and C4 complement  - ent consulted, f/u recs  - hold further epinephrine and steroids for now  - monitor airway  - aspiration precautions  - deep suctioning    CARDIO  No active issues.    PULM  #AHRF  Currently on 3L, no episode of hypoxemia, and saturating well at 198 - 100%    GI  #NPO  - q6 finger sticks    RENAL/  #Hypernatremia  Euvolemic, given 1L in the ED. Will recommend to give 1L dextrose  - give d5 1L bolus    ID  No active issues.    HEME/ONC  No active issues.    Rheumatologic  #Discoid lupus  Home meds tacrolimus .1% cream, ciclopirox 1%, betametah dip aug, and mometasone  - hold for now until angioedema resolves    DISPO  F: 1L  E: replenish prn  N: npo  GI PPX: none  DVT PPX: lovenox  DISPO: MICU Assessment:  60 y/o blind F with PMHx down syndrome, discoid lupus presenting with tongue swelling. ICU consulted for airway monitoring    NEURO  Unknown baseline, though responds to name. Received ketamine in ED. Home med olanzapine 2.5 and trazodone. Stroke code in ED for decrease tongue tone.  - reassess mental status when more awake  - hold home sedatives  - f/u stroke recs    HEENT  #C/f angioedema  #Oropharyngeal swelling  Per health aid, swelling increased from baseline. Received epinephrine and steroids in the ED. ENT scope without concern for swelling of the airway. Given bilateral chemosis and rx of allergic eyedrops, patient may have had exposure to allergen prior to today. In list of home meds, tacrolimus cream for discoid lupus, which may cause angioedema, and could have accidentally ingested. Otherwise patient is in group home, and may have been exposed medication for other patient. Less likely hereditary angioedema, though reasonable to work up. Less likely psychogenic, though patient is on olanzapine, atypical antipsychotic, and low likelihood for tardive dyskinesia.  - f/u C1 esterase inhibtitor, C1Q, C3 and C4 complement  - ent consulted, f/u recs  - hold further epinephrine and steroids for now  - monitor airway  - aspiration precautions  - deep suctioning    CARDIO  #Tachycardia  Heart rates in the 110s. Likely iso of hypovolemia vs agitation  - continue to monitor    PULM  #AHRF  Currently on 3L, no episode of hypoxemia, and saturating well at 198 - 100%  - wean as tolerated    GI  #NPO  - q6 finger sticks    RENAL/  #Hypernatremia  Euvolemic, given 1L in the ED. Will recommend to give 1L dextrose  - give d5 1L bolus    ID  No active issues.    HEME/ONC  No active issues.    Rheumatologic  #Discoid lupus  Home meds tacrolimus .1% cream, ciclopirox 1%, betametah dip aug, and mometasone  - hold for now until angioedema resolves    DISPO  F: 1L  E: replenish prn  N: npo  GI PPX: none  DVT PPX: lovenox  DISPO: MICU

## 2025-04-11 NOTE — ED PROVIDER NOTE - PHYSICAL EXAMINATION
general: Agitated, moving around, thin  HEENT: Normocephalic, atraumatic, extraocular movements intact, prominent tongue swelling, spitting up  CV: Regular rate  Pulm: No respiratory distress, no tachypnea  Abd: Flat, no gross distension, soft  Ext: warm and well perfused  Skin: No gross rashes or lesions  Neuro: Moving all extremities, agitated

## 2025-04-11 NOTE — ED PROVIDER NOTE - CLINICAL SUMMARY MEDICAL DECISION MAKING FREE TEXT BOX
58 yo with tongue swelling, concern for possible airway compromise /angioedema/anaphlyaxis. Will treat with epi/benadryl/steroid/pepcid. ENT called.

## 2025-04-11 NOTE — CONSULT NOTE ADULT - SUBJECTIVE AND OBJECTIVE BOX
OTOLARYNGOLOGY (ENT) CONSULTATION NOTE    PATIENT: CELSA MORENO     MRN: [id number]       : 61  DATE OF ADMISSION:25  DATE OF SERVICE:  25 @ 14:29    CHIEF COMPLAINT: tongue swelling     HISTORY OF PRESENT ILLNESS:  CELSA MORENO  is a 58 y/o blind F with PMHx down syndrome, discoid lupus presenting with tongue swelling, last known well noon. Per aid, pt had fall 2 weeks  patient had a fall and went to OSH (unsure which one), had laceration around mouth (does not know where). States there was subsequent tongue swelling that had since resolved. Per aid, while eating around noon, noticed that patient was spitting up food and tongue appeared swollen and patient was not able to move it or keep her tongue in her mouth when leaning forward- , prompting 911 call. Unable to obtain any history from patient. Per aid, was recently give new eye drop yesterday (does not know which eye drop). Denies known ho anaphlyaxis. No other new foods or meds. No vomiting, no wheezing. ROS as above. As per aid tongue has not changed since initial changes around noon.  No history of ace/arb use. Patient unable to respond to commands (baseline).     CURRENT MEDICATIONS   ketamine Injectable 40 IV Push  sodium chloride 0.9% Bolus 1000 IV Bolus      ALLERGIES:  citrus (Unknown)  No Known Drug Allergies    SOCIAL HISTORY: Pertinent included in HPI   FAMILY HISTORY: Pertinent included in HPI       SURGICAL HISTORY: Pertinent included in HPI       REVIEW OF SYSTEMS: The patient was asked and responded to a review of systems regarding the following symptoms: constitutional, eye, ears, nose, mouth, throat, cardiovascular, respiratory. Pertinent factors have been included in the HPI.     PHYSICAL EXAMINATION:    The pertinent positive and negative examination findings were:  PHYSICAL EXAM:    CONSTITUTIONAL: Patient is sitting upright leaning forward - baseline AOx0  HEAD: normocephalic, atraumatic.  EARS: The right/left pinna was normal.   NOSE: Normal external nose. Anterior nasal cavity patent with no obstruction.   ORAL CAVITY/OROPHARYNX: normal mucosa. No erythema, lesions or bleeding. no signs of previous trauma, edentulous, lack of tongue tone, mild lungual edema, FOM and BOT flat,   RESPIRATORY: Respirations unlabored, no increased work of breathing with use of accessory muscles and retractions. No stridor.  CARDIAC: Warm extremities      Vital Signs:  T(C): 36.3 (25 @ 12:53), Max: 36.3 (25 @ 12:53)  HR: 117 (25 @ 14:02) (104 - 122)  BP: 110/74 (25 @ 14:02) (97/58 - 156/83)  RR: 16 (25 @ 13:59) (16 - 22)  SpO2: 96% (25 @ 13:59) (93% - 96%)                        13.9   8.66  )-----------( 310      ( 2025 13:06 )             42.1        150[H]  |  110[H]  |  21  ----------------------------<  99  4.5   |  30  |  0.75    Ca    10.0      2025 13:06                -------------------------------  ASSESSMENT/PLAN:    IMPRESSION: CELSA MORENO  is a 59y Female with   Patient tolerated laying flat for CT head.       RECOMMENDATIONS:    ---  Thank you for the kind referral and for allowing me to share in the care of CELSA MORENO If you have any questions, please do not hesitate to contact me.     Sincerely,  Sary Long PA-C  25 @ 14:29       OTOLARYNGOLOGY (ENT) CONSULTATION NOTE    PATIENT: CELSA OMRENO     MRN: [id number]       : 61  DATE OF ADMISSION:25  DATE OF SERVICE:  25 @ 14:29    CHIEF COMPLAINT: tongue swelling     HISTORY OF PRESENT ILLNESS:  CELSA MORENO  is a 60 y/o blind F with PMHx down syndrome, discoid lupus presenting with tongue swelling, last known well noon. Per aid, pt had fall 2 weeks  patient had a fall and went to OSH (unsure which one), had laceration around mouth (does not know where). States there was subsequent tongue swelling that had since resolved. Per aid, while eating around noon, noticed that patient was spitting up food and tongue appeared swollen and patient was not able to move it or keep her tongue in her mouth when leaning forward- , prompting 911 call. Unable to obtain any history from patient. Per aid, was recently give new eye drop yesterday (does not know which eye drop). Denies known ho anaphlyaxis. No other new foods or meds. No vomiting, no wheezing. ROS as above. As per aid tongue has not changed since initial changes around noon.  No history of ace/arb use. Patient unable to respond to commands (baseline).     CURRENT MEDICATIONS   ketamine Injectable 40 IV Push  sodium chloride 0.9% Bolus 1000 IV Bolus      ALLERGIES:  citrus (Unknown)  No Known Drug Allergies    SOCIAL HISTORY: Pertinent included in HPI   FAMILY HISTORY: Pertinent included in HPI       SURGICAL HISTORY: Pertinent included in HPI       REVIEW OF SYSTEMS: The patient was asked and responded to a review of systems regarding the following symptoms: constitutional, eye, ears, nose, mouth, throat, cardiovascular, respiratory. Pertinent factors have been included in the HPI.     PHYSICAL EXAMINATION:    The pertinent positive and negative examination findings were:  PHYSICAL EXAM:    CONSTITUTIONAL: Patient is sitting upright leaning forward - baseline AOx0  HEAD: normocephalic, atraumatic.  EARS: The right/left pinna was normal.   NOSE: Normal external nose. Anterior nasal cavity patent with no obstruction.   ORAL CAVITY/OROPHARYNX: normal mucosa. No erythema, lesions or bleeding. no signs of previous trauma, edentulous, lack of tongue tone, mild lingual edema, FOM and BOT flat,   RESPIRATORY: Respirations unlabored, no increased work of breathing with use of accessory muscles and retractions. No stridor.  CARDIAC: Warm extremities      Vital Signs:  T(C): 36.3 (25 @ 12:53), Max: 36.3 (25 @ 12:53)  HR: 117 (25 @ 14:02) (104 - 122)  BP: 110/74 (25 @ 14:02) (97/58 - 156/83)  RR: 16 (25 @ 13:59) (16 - 22)  SpO2: 96% (25 @ 13:59) (93% - 96%)                        13.9   8.66  )-----------( 310      ( 2025 13:06 )             42.1        150[H]  |  110[H]  |  21  ----------------------------<  99  4.5   |  30  |  0.75    Ca    10.0      2025 13:06        PROCEDURE NOTE:PROCEDURE NOTE:    Procedure: Flexible laryngoscopy (CPT 85722)     Pre-Procedure Diagnosis: lingual edema/ lack of tone     Post-Procedure Diagnosis: same       Indications for Procedure: CELSA MORENO is a59y  Female who presents with lingual edema/ lack of tone  . See above full HPI for further details. A detailed assessment of the nasal cavity, nasopharynx, hypopharynx, oropharynx, and larynx was required. An indirect mirror examination was not sufficient for complete evaluation due to patient discomfort or incomplete view. Therefore flexible laryngoscopy was performed.       Description of Procedure: Ketamine was given to aid in cooperation. After obtaining verbal consent, a flexible fiberoptic laryngoscope was inserted into the right nasal cavity. The nasal anatomy was examined for evidence of  nasal cavity obstruction. The septum was examined for clinically significant deviation.  Passing the flexible scope into the oropharynx and hypopharynx allowed examination of the base of tongue and vallecula and epiglottis. The piriform sinuses were examined for lesions and pooling of secretions or visible aspiration. The false vocal cords and true vocal cords were examined. The true vocal cords were examined for mobility, symmetry and closure. The visualized portion of the subglottis examined. The pharynx was examined for  visible extrinsic compression. The patient tolerated the procedure well without complications.      Findings:  BOT non edematous epiglottis crisp, unable to assess full motion of cords due to mental status and pre procedure medication. no airway obstruction, piriform sinuses with mild secretions.       While the patient was sitting upright i put the scope through the mouth to assess. No obstruction noted of BOT in posterior pharynx  tongue was fully in mouth, no signs of trauma.

## 2025-04-11 NOTE — H&P ADULT - ATTENDING COMMENTS
Angioedema of unclear etiology. Serologic workup as per above. NPO. ENT recs appreciated. In MICU for airway monitoring. Rest as per above.

## 2025-04-11 NOTE — H&P ADULT - HISTORY OF PRESENT ILLNESS
60 y/o blind F with PMHx Down syndrome, cerebral palsy, discoid lupus presenting with tongue swelling. LKW 12:00PM 4/11/25. Per HHA, patient fell two weeks ago and injured her jaw, had a laceration in her tongue with placement of stitches (unclear location), and "everything was getting better then all of a sudden today her tongue swelled up" while eating lunch. HHA denies witnessing any jerking movements, incontinence, unclear to tell if staring episode as patient has eyes closed at baseline. Was brought to ED, was seen by ENT who proceeded to scope her (with administration of ketamine). Per ED team, report was that "loss of tone" was seen in her tongue during the scope and stroke should be on board, per ENT recs. Unable to obtain story from patient, as she is nonverbal. HHA states patient is currently at her baseline mental status, which is groaning/moaning, not following commands, eyes closed, leaning towards the R without interacting with others. States patient mostly spend the day on her wheelchair but will occasionally walk assisted. Per aid, patient was recently given new eye drop yesterday (does not know which eye drop). Not taking any ACE/ARB medications, NSAIDs. Admitted to ICU for airway monitoring    ED course 58 y/o blind F with PMHx Down syndrome, cerebral palsy, discoid lupus presenting with tongue swelling. LKW 12:00PM 4/11/25. Per HHA, patient fell two weeks ago and injured her jaw, had a laceration in her tongue with placement of stitches (unclear location), and "everything was getting better then all of a sudden today her tongue swelled up" while eating lunch. HHA denies witnessing any jerking movements, incontinence, unclear to tell if staring episode as patient has eyes closed at baseline. Was brought to ED, was seen by ENT who proceeded to scope her (with administration of ketamine). Per ED team, report was that "loss of tone" was seen in her tongue during the scope and stroke should be on board, per ENT recs. Unable to obtain story from patient, as she is nonverbal. HHA states patient is currently at her baseline mental status, which is groaning/moaning, not following commands, eyes closed, leaning towards the R without interacting with others. States patient mostly spend the day on her wheelchair but will occasionally walk assisted. Per aid, patient was recently given new eye drop yesterday (does not know which eye drop). Not taking any ACE/ARB medications, NSAIDs. Admitted to ICU for airway monitoring    home meds:  MEDICATIONS:  Trazodone 100mg qhs  Olanzapine 2.5mg qhs  olopatadine .2% eye drops  tacrolimus .1% oint  ciclopirox 1% shampoo  betameth dip augmentin .05% to scalp  mometasone .1% scalp      ED course:  vitals: NC 95% on 3L NC  labs: INR 1.19, Na 150, Cr 0.75 other chem wnl  imagingL CT brain/angio (stroke protocol) -   consults: neurology, stroke, ENT   interventions: flex laryngoscopy by ENT

## 2025-04-11 NOTE — H&P ADULT - ASSESSMENT
60 y/o blind F with PMHx down syndrome, discoid lupus presenting with tongue swelling. ICU consulted for airway monitoring    NEURO  Unknown baseline, though responds to name. Received ketamine in ED. Home med olanzapine 2.5 and trazodone. Stroke code in ED for decrease tongue tone.  - reassess mental status when more awake  - hold home sedatives  - f/u stroke recs    HEENT  #C/f angioedema  #Oropharyngeal swelling  Per health aid, swelling increased from baseline. Received epinephrine and steroids in the ED. ENT scope without concern for swelling of the airway. Given bilateral chemosis and rx of allergic eyedrops, patient may have had exposure to allergen prior to today. In list of home meds, tacrolimus cream for discoid lupus, which may cause angioedema, and could have accidentally ingested. Otherwise patient is in group home, and may have been exposed medication for other patient. Less likely hereditary angioedema, though reasonable to work up. Less likely psychogenic, though patient is on olanzapine, atypical antipsychotic, and low likelihood for tardive dyskinesia.    Plan  - f/u C1 esterase inhibtitor, C1Q, C3 and C4 complement  - ent consulted, f/u recs  - hold further epinephrine and steroids for now  - monitor airway  - aspiration precautions  - deep suctioning    CARDIO  #Tachycardia  Heart rates in the 110s. Likely iso of hypovolemia vs agitation  - continue to monitor    PULM  #AHRF  Currently on 3L, no episode of hypoxemia, and saturating well at 198 - 100%  - wean as tolerated    GI  #NPO  - q6 finger sticks    RENAL/  #Hypernatremia  Euvolemic, given 1L in the ED. Will recommend to give 1L dextrose  - give d5 1L bolus    ID  No active issues.    HEME/ONC  No active issues.    Rheumatologic  #Discoid lupus  Home meds tacrolimus .1% cream, ciclopirox 1%, betametah dip aug, and mometasone  - hold for now until angioedema resolves    DISPO  F: 1L  E: replenish prn  N: npo  GI PPX: none  DVT PPX: lovenox  DISPO: MICU

## 2025-04-11 NOTE — CONSULT NOTE ADULT - ASSESSMENT
59y Female with PMH    CT head showed:  CTA/CTP with  Initial NIHSS:    1)Secondary stroke prevention  - start ASA 81mg PO daily and Plavix 75mg PO daily  - start Atorvastatin 80mg PO daily    2) Stroke risk factors  - A1C:   - LDL:   - atrial fibrillation  - HTN    3) Further management  - obtain MRI brain without  - recommend SBP goal <180  - recommend q4hr stroke neuro checks  - may need outpt neurology follow up  - provided stroke education about signs and symptoms    DVT prophylaxis   -Lovenox SQ and SCDs    Case discussed with  **** 59y Female with PMH    CT head showed:  CTA/CTP with  Initial NIHSS:     INCOMPLETE    3) Further management    - recommend SBP goal <180  - recommend q4hr stroke neuro checks  - may need outpt neurology follow up  - provided stroke education about signs and symptoms      Case discussed with  **** 58 y/o blind F with PMHx Down syndrome, cerebral palsy, discoid lupus presenting with tongue swelling. LKW 12:00PM 4/11/25. Per HHA, patient fell two weeks ago and injured her jaw, had a laceration in her tongue with placement of stitches (unclear location), and "everything was getting better then all of a sudden today her tongue swelled up" while eating lunch. HHA denies witnessing any jerking movements, incontinence, unclear to tell if staring episode as patient has eyes closed at baseline. Was brought to ED, was seen by ENT who proceeded to scope her (with administration of ketamine). Per ED team, report was that "loss of tone" was seen in her tongue during the scope and stroke should be on board, per ENT recs. Unable to obtain story from patient, as she is nonverbal. HHA states patient is currently at her baseline mental status, which is groaning/moaning, not following commands, eyes closed, leaning towards the R without interacting with others. Per aid, patient was recently given new eye drop yesterday (does not know which eye drop). Denies any known hx of anaphylaxis. At time of examination, patient not fully participating but noted to have severe tongue swelling, initially appearing to be deviated to the right however corrected when patient was sat straight in the stretcher. Also noted to have swelling in b/l eyelid mucosa and erythema. /71. NIHSS 19 (all from baseline deficits). CT scans negative.    CT head showed: no acute hemorrhagic or ischemic stroke  CTA/CTP with no stenosis, occlusion or perfusion deficit.  Initial NIHSS: 19 (baseline)  ENT scope results: no airway concerns or laryngeal swelling.     INCOMPLETE    Impression: very low suspicion for stroke. Suspect lose of tone described by ENT is secondary to tongue edema, still of unclear etiology. No acute thrombolytic intervention needed. No stroke admission warranted    Further management:  - no stroke admission warranted  - rest of care per primary taem    Stroke team to sign off at this time    Pending discussion with Dr Man 60 y/o blind F with PMHx Down syndrome, cerebral palsy, discoid lupus presenting with tongue swelling. LKW 12:00PM 4/11/25. Per HHA, patient fell two weeks ago and injured her jaw, had a laceration in her tongue with placement of stitches (unclear location), and "everything was getting better then all of a sudden today her tongue swelled up" while eating lunch. HHA denies witnessing any jerking movements, incontinence, unclear to tell if staring episode as patient has eyes closed at baseline. Was brought to ED, was seen by ENT who proceeded to scope her (with administration of ketamine). Per ED team, report was that "loss of tone" was seen in her tongue during the scope and stroke should be on board, per ENT recs. Unable to obtain story from patient, as she is nonverbal. HHA states patient is currently at her baseline mental status, which is groaning/moaning, not following commands, eyes closed, leaning towards the R without interacting with others. Per aid, patient was recently given new eye drop yesterday (does not know which eye drop). Denies any known hx of anaphylaxis. At time of examination, patient not fully participating but noted to have severe tongue swelling, initially appearing to be deviated to the right however corrected when patient was sat straight in the stretcher. Also noted to have swelling in b/l eyelid mucosa and erythema. /71. NIHSS 19 (all from baseline deficits). CT scans negative.    CT head showed: no acute hemorrhagic or ischemic stroke  CTA/CTP with no stenosis, occlusion or perfusion deficit.  Initial NIHSS: 19 (baseline)  ENT scope results: no airway concerns or laryngeal swelling.     Impression: very low suspicion for stroke. Suspect lose of tone described by ENT is secondary to tongue edema, still of unclear etiology. No acute thrombolytic intervention needed. No stroke admission warranted    Rest of care per primary team. Stroke team to sign off at this time    Pending discussion with Dr Man

## 2025-04-11 NOTE — ED PROVIDER NOTE - PROGRESS NOTE DETAILS
ENT scoped patient, mild protuberance of base of tongue but airway patent. Required ketamine for sedation, emergency procedure. Believes pt may not have tone of tongue as sticking out when patient puts face forward, last known well noon, will call stroke code for imaging and emergent neuro eval.

## 2025-04-11 NOTE — CONSULT NOTE ADULT - ASSESSMENT
-------------------------------  ASSESSMENT/PLAN:    IMPRESSION: CELSA MORENO  is a 60 y/o blind F with PMHx down syndrome, discoid lupus presenting with tongue swelling, last known well noon. Per aid, pt had fall 2 weeks  patient had a fall and went to OSH (unsure which one), had laceration around mouth (does not know where). States there was subsequent tongue swelling that had since resolved. Per aid, while eating around noon, noticed that patient was spitting up food and tongue appeared swollen and patient was not able to move it or keep her tongue in her mouth when leaning forward- , prompting 911 call. Unable to obtain any history from patient. Per aid, was recently give new eye drop yesterday (does not know which eye drop). Denies known ho anaphlyaxis. No other new foods or meds. No vomiting, no wheezing. ROS as above. As per aid tongue has not changed since initial changes around noon.  No history of ace/arb use. Patient unable to respond to commands (baseline). Patient tolerated laying flat for CT head. Scope revealed no airway concerns or laryngeal swelling.       RECOMMENDATIONS: Spoke with Dr. Tavares who agrees   - Unclear ideology for lack of tongue tone,   - MRI as per neuro recs   - Airway seems stable from ENT standpoint   ---  Thank you for the kind referral and for allowing me to share in the care of CELSA MORENO If you have any questions, please do not hesitate to contact me.     Sincerely,  Sary Long PA-C  04-11-25 @ 14:29

## 2025-04-11 NOTE — H&P ADULT - NSHPLABSRESULTS_GEN_ALL_CORE
.  LABS:                         13.9   8.66  )-----------( 310      ( 11 Apr 2025 13:06 )             42.1     04-11    150[H]  |  110[H]  |  21  ----------------------------<  99  4.5   |  30  |  0.75    Ca    10.0      11 Apr 2025 13:06  Phos  3.8     04-11  Mg     2.4     04-11    TPro  8.1  /  Alb  3.8  /  TBili  0.2  /  DBili  <0.1  /  AST  23  /  ALT  15  /  AlkPhos  88  04-11    PT/INR - ( 11 Apr 2025 14:00 )   PT: 13.9 sec;   INR: 1.19          PTT - ( 11 Apr 2025 14:00 )  PTT:22.3 sec  Urinalysis Basic - ( 11 Apr 2025 13:06 )    Color: x / Appearance: x / SG: x / pH: x  Gluc: 99 mg/dL / Ketone: x  / Bili: x / Urobili: x   Blood: x / Protein: x / Nitrite: x   Leuk Esterase: x / RBC: x / WBC x   Sq Epi: x / Non Sq Epi: x / Bacteria: x            RADIOLOGY, EKG & ADDITIONAL TESTS: Reviewed.

## 2025-04-11 NOTE — ED ADULT TRIAGE NOTE - CHIEF COMPLAINT QUOTE
Pt presents to ED C/O worsening tongue swelling PTA. Pt HHA at bedside states, " She has CP, she fell two weeks ago and injured her jaw, she had stiches in her tongue, everything was getting better then all of a sudden today her tongue swelled up". HHA reports pt trying new eye drops recently. No new food or medications. Allergy to citrus.

## 2025-04-11 NOTE — ED PROVIDER NOTE - OBJECTIVE STATEMENT
58 y/o blind F with PMHx down syndrome, discoid lupus presenting with tongue swelling, last known well noon. Per aid, pt had fall 2 weeks PTA, went to OSH (unsure which one), had laceration around mouth (does not know where). States there was subsequent tongue swelling that had since resolved. Per aid, while eating around noon, noticed that patient was spitting up food and tongue appeared significantly more swollen, prompting 911 call. Unable to obtain any history from patient. Per aid, was recently give new eye drop yesterday (does not know which eye drop). Denies known ho anaphlyaxis. No other new foods or meds. No vomiting, no wheezing. ROS as above.

## 2025-04-11 NOTE — CONSULT NOTE ADULT - SUBJECTIVE AND OBJECTIVE BOX
**STROKE CODE CONSULT NOTE**    Last known well time/Time of onset of symptoms: 12:00PM    HPI: 60 y/o blind F with PMHx Down syndrome, cerebral palsy, discoid lupus presenting with tongue swelling. LKW 12:00PM 4/11/25. Per HHA, patient fell two weeks ago and injured her jaw, had a laceration in her tongue with placement of stitches (unclear location), and "everything was getting better then all of a sudden today her tongue swelled up" while eating lunch. HHA denies witnessing any jerking movements, incontinence, unclear to tell if staring episode as patient has eyes closed at baseline. Was brought to ED, was seen by ENT who proceeded to scope her (with administration of ketamine). Per ED team, report was that "loss of tone" was seen in her tongue during the scope and stroke should be on board, per ENT recs. Unable to obtain story from patient, as she is nonverbal. HHA states patient is currently at her baseline mental status, which is groaning/moaning, not following commands, eyes closed, leaning towards the R without interacting with others. States patient mostly spend the day on her wheelchair but will occasionally walk assisted. Per aid, patient was recently given new eye drop yesterday (does not know which eye drop). Denies any known hx of anaphylaxis. At time of examination, patient not fully participating but noted to have severe tongue swelling, initially appearing to be deviated to the right however corrected when patient was sat straight in the stretcher. Also noted to have swelling in b/l eyelid mucosa and erythema.    T(C): 36.3 (04-11-25 @ 12:53), Max: 36.3 (04-11-25 @ 12:53)  HR: 120 (04-11-25 @ 14:38) (104 - 122)  BP: 148/71 (04-11-25 @ 14:38) (97/58 - 156/83)  RR: 16 (04-11-25 @ 14:38) (16 - 22)  SpO2: 99% (04-11-25 @ 14:38) (93% - 100%)    PAST MEDICAL & SURGICAL HISTORY:  Down syndrome  Cerebral Palsy  Discoid lupus      SOCIAL HISTORY:   Patient has HHA  Unable to obtain rest of social history due to patient's baseline mental status    ROS: Unable to obtain    MEDICATIONS  (STANDING):  ketamine Injectable 40 milliGRAM(s) IV Push Once    MEDICATIONS  (PRN):    Allergies    citrus (Unknown)  No Known Drug Allergies    Intolerances      Vital Signs Last 24 Hrs  T(C): 36.3 (11 Apr 2025 12:53), Max: 36.3 (11 Apr 2025 12:53)  T(F): 97.3 (11 Apr 2025 12:53), Max: 97.3 (11 Apr 2025 12:53)  HR: 120 (11 Apr 2025 14:38) (104 - 122)  BP: 148/71 (11 Apr 2025 14:38) (97/58 - 156/83)  BP(mean): --  RR: 16 (11 Apr 2025 14:38) (16 - 22)  SpO2: 99% (11 Apr 2025 14:38) (93% - 100%)    Parameters below as of 11 Apr 2025 14:38  Patient On (Oxygen Delivery Method): nasal cannula  O2 Flow (L/min): 3      Physical exam:  Constitutional: No acute distress, conversant  Eyes: Anicteric sclerae, moist conjunctivae, see below for CNs  Neck: trachea midline, FROM, supple, no thyromegaly or lymphadenopathy  Cardiovascular: Regular rate and rhythm, no murmurs, rubs, or gallops. No carotid bruits.   Pulmonary: Anterior breath sounds clear bilaterally, no crackles or wheezing. No use of accessory muscles  GI: Abdomen soft, non-distended, non-tender  Extremities: Radial and DP pulses +2, no edema    Neurologic:  -Mental status: Awake, alert, oriented to person, age, and month. Speech is fluent with intact naming, repetition, and comprehension, no dysarthria. Recent and remote memory intact. Follows commands. Attention/concentration intact. Fund of knowledge appropriate.  -Cranial nerves:   II: Visual fields are full to confrontation.  III, IV, VI: Extraocular movements are intact without nystagmus. Pupils equally round and reactive to light  V:  Facial sensation V1-V3 equal and intact   VII: Face is symmetric with normal eye closure and smile  VIII: Hearing is bilaterally intact to finger rub  IX, X: Uvula is midline and soft palate rises symmetrically  XI: Head turning and shoulder shrug are intact.  XII: Tongue protrudes midline  Motor: Normal bulk and tone. No pronator drift. Strength bilateral upper extremity 5/5, bilateral lower extremities 5/5.  Rapid alternating movements intact and symmetric  Sensation: Intact to light touch bilaterally. No neglect or extinction on double simultaneous testing.  Coordination: No dysmetria on finger-to-nose and heel-to-shin bilaterally  Reflexes: Downgoing toes bilaterally   Gait: Narrow gait and steady    NIHSS: **** ASPECT Score: ***** ICH Score: ****** (GCS)    Fingerstick Blood Glucose: CAPILLARY BLOOD GLUCOSE        LABS:                        13.9   8.66  )-----------( 310      ( 11 Apr 2025 13:06 )             42.1     04-11    150[H]  |  110[H]  |  21  ----------------------------<  99  4.5   |  30  |  0.75    Ca    10.0      11 Apr 2025 13:06            Urinalysis Basic - ( 11 Apr 2025 13:06 )    Color: x / Appearance: x / SG: x / pH: x  Gluc: 99 mg/dL / Ketone: x  / Bili: x / Urobili: x   Blood: x / Protein: x / Nitrite: x   Leuk Esterase: x / RBC: x / WBC x   Sq Epi: x / Non Sq Epi: x / Bacteria: x        RADIOLOGY & ADDITIONAL STUDIES:      -----------------------------------------------------------------------------------------------------------------  IV-thrombolytic (Y/N):  No                 Reason thrombolytic not given: Isolated neurologic deficits not warranting additional points in NIHSS baseline score    **STROKE CODE CONSULT NOTE**    Last known well time/Time of onset of symptoms: 12:00PM    HPI: 58 y/o blind F with PMHx Down syndrome, cerebral palsy, discoid lupus presenting with tongue swelling. LKW 12:00PM 4/11/25. Per HHA, patient fell two weeks ago and injured her jaw, had a laceration in her tongue with placement of stitches (unclear location), and "everything was getting better then all of a sudden today her tongue swelled up" while eating lunch. HHA denies witnessing any jerking movements, incontinence, unclear to tell if staring episode as patient has eyes closed at baseline. Was brought to ED, was seen by ENT who proceeded to scope her (with administration of ketamine). Per ED team, report was that "loss of tone" was seen in her tongue during the scope and stroke should be on board, per ENT recs. Unable to obtain story from patient, as she is nonverbal. HHA states patient is currently at her baseline mental status, which is groaning/moaning, not following commands, eyes closed, leaning towards the R without interacting with others. States patient mostly spend the day on her wheelchair but will occasionally walk assisted. Per aid, patient was recently given new eye drop yesterday (does not know which eye drop). Denies any known hx of anaphylaxis. At time of examination, patient not fully participating but noted to have severe tongue swelling, initially appearing to be deviated to the right however corrected when patient was sat straight in the stretcher. Also noted to have swelling in b/l eyelid mucosa and erythema.    T(C): 36.3 (04-11-25 @ 12:53), Max: 36.3 (04-11-25 @ 12:53)  HR: 120 (04-11-25 @ 14:38) (104 - 122)  BP: 148/71 (04-11-25 @ 14:38) (97/58 - 156/83)  RR: 16 (04-11-25 @ 14:38) (16 - 22)  SpO2: 99% (04-11-25 @ 14:38) (93% - 100%)    PAST MEDICAL & SURGICAL HISTORY:  Down syndrome  Cerebral Palsy  Discoid lupus      SOCIAL HISTORY:   Patient has HHA  Unable to obtain rest of social history due to patient's baseline mental status    ROS: Unable to obtain    MEDICATIONS  (STANDING):  ketamine Injectable 40 milliGRAM(s) IV Push Once    MEDICATIONS  (PRN):    Allergies    citrus (Unknown)  No Known Drug Allergies    Intolerances      Vital Signs Last 24 Hrs  T(C): 36.3 (11 Apr 2025 12:53), Max: 36.3 (11 Apr 2025 12:53)  T(F): 97.3 (11 Apr 2025 12:53), Max: 97.3 (11 Apr 2025 12:53)  HR: 120 (11 Apr 2025 14:38) (104 - 122)  BP: 148/71 (11 Apr 2025 14:38) (97/58 - 156/83)  BP(mean): --  RR: 16 (11 Apr 2025 14:38) (16 - 22)  SpO2: 99% (11 Apr 2025 14:38) (93% - 100%)    Parameters below as of 11 Apr 2025 14:38  Patient On (Oxygen Delivery Method): nasal cannula  O2 Flow (L/min): 3      Physical exam:  Constitutional: Not participating in exam, groaning, eyes closed  Eyes: White exudate noted on R eye. B/l mucosa edematous and erythematous  Tongue: Severely edematous. Midline  Neck: trachea midline  Cardiovascular: Regular rate and rhythm on monitor  Pulmonary: No use of accessory muscles  Extremities: no edema    Neurologic:  -Mental status: Awake, alert, oriented to person, age, and month. Speech is fluent with intact naming, repetition, and comprehension, no dysarthria. Recent and remote memory intact. Follows commands. Attention/concentration intact. Fund of knowledge appropriate.  -Cranial nerves:   II: Visual fields are full to confrontation.  III, IV, VI: Extraocular movements are intact without nystagmus. Pupils equally round and reactive to light  V:  Facial sensation V1-V3 equal and intact   VII: Face is symmetric with normal eye closure and smile  VIII: Hearing is bilaterally intact to finger rub  IX, X: Uvula is midline and soft palate rises symmetrically  XI: Head turning and shoulder shrug are intact.  XII: Tongue protrudes midline  Motor: Increased tone throughout. No pronator drift. Strength bilateral upper extremity 5/5, bilateral lower extremities 5/5.  Rapid alternating movements intact and symmetric  Sensation: Intact to light touch bilaterally. No neglect or extinction on double simultaneous testing.  Coordination: No dysmetria on finger-to-nose and heel-to-shin bilaterally  Reflexes: Downgoing toes bilaterally   Gait: Narrow gait and steady    NIHSS: **** ASPECT Score: ***** ICH Score: ****** (GCS)    Fingerstick Blood Glucose: CAPILLARY BLOOD GLUCOSE        LABS:                        13.9   8.66  )-----------( 310      ( 11 Apr 2025 13:06 )             42.1     04-11    150[H]  |  110[H]  |  21  ----------------------------<  99  4.5   |  30  |  0.75    Ca    10.0      11 Apr 2025 13:06            Urinalysis Basic - ( 11 Apr 2025 13:06 )    Color: x / Appearance: x / SG: x / pH: x  Gluc: 99 mg/dL / Ketone: x  / Bili: x / Urobili: x   Blood: x / Protein: x / Nitrite: x   Leuk Esterase: x / RBC: x / WBC x   Sq Epi: x / Non Sq Epi: x / Bacteria: x        RADIOLOGY & ADDITIONAL STUDIES:      -----------------------------------------------------------------------------------------------------------------  IV-thrombolytic (Y/N):  No                 Reason thrombolytic not given: Isolated neurologic deficits not warranting additional points in NIHSS baseline score    **STROKE CODE CONSULT NOTE**    Last known well time/Time of onset of symptoms: 12:00PM    HPI: 60 y/o blind F with PMHx Down syndrome, cerebral palsy, discoid lupus presenting with tongue swelling. LKW 12:00PM 4/11/25. Per HHA, patient fell two weeks ago and injured her jaw, had a laceration in her tongue with placement of stitches (unclear location), and "everything was getting better then all of a sudden today her tongue swelled up" while eating lunch. HHA denies witnessing any jerking movements, incontinence, unclear to tell if staring episode as patient has eyes closed at baseline. Was brought to ED, was seen by ENT who proceeded to scope her (with administration of ketamine). Per ED team, report was that "loss of tone" was seen in her tongue during the scope and stroke should be on board, per ENT recs. Unable to obtain story from patient, as she is nonverbal. HHA states patient is currently at her baseline mental status, which is groaning/moaning, not following commands, eyes closed, leaning towards the R without interacting with others. States patient mostly spend the day on her wheelchair but will occasionally walk assisted. Per aid, patient was recently given new eye drop yesterday (does not know which eye drop). Denies any known hx of anaphylaxis. At time of examination, patient not fully participating but noted to have severe tongue swelling, initially appearing to be deviated to the right however corrected when patient was sat straight in the stretcher. Also noted to have swelling in b/l eyelid mucosa and erythema.    T(C): 36.3 (04-11-25 @ 12:53), Max: 36.3 (04-11-25 @ 12:53)  HR: 120 (04-11-25 @ 14:38) (104 - 122)  BP: 148/71 (04-11-25 @ 14:38) (97/58 - 156/83)  RR: 16 (04-11-25 @ 14:38) (16 - 22)  SpO2: 99% (04-11-25 @ 14:38) (93% - 100%)    PAST MEDICAL & SURGICAL HISTORY:  Down syndrome  Cerebral Palsy  Discoid lupus      SOCIAL HISTORY:   Patient has HHA  Unable to obtain rest of social history due to patient's baseline mental status    ROS: Unable to obtain    MEDICATIONS  (STANDING):  ketamine Injectable 40 milliGRAM(s) IV Push Once    MEDICATIONS  (PRN):    Allergies    citrus (Unknown)  No Known Drug Allergies    Intolerances      Vital Signs Last 24 Hrs  T(C): 36.3 (11 Apr 2025 12:53), Max: 36.3 (11 Apr 2025 12:53)  T(F): 97.3 (11 Apr 2025 12:53), Max: 97.3 (11 Apr 2025 12:53)  HR: 120 (11 Apr 2025 14:38) (104 - 122)  BP: 148/71 (11 Apr 2025 14:38) (97/58 - 156/83)  BP(mean): --  RR: 16 (11 Apr 2025 14:38) (16 - 22)  SpO2: 99% (11 Apr 2025 14:38) (93% - 100%)    Parameters below as of 11 Apr 2025 14:38  Patient On (Oxygen Delivery Method): nasal cannula  O2 Flow (L/min): 3      Physical exam:  Constitutional: Not participating in exam, groaning, eyes closed  Eyes: White exudate noted on R eye. B/l mucosa edematous and erythematous  Tongue: Severely edematous. Midline  Neck: trachea midline  Cardiovascular: Regular rate and rhythm on monitor  Pulmonary: No use of accessory muscles  Extremities: no edema    Neurologic:  -Mental status: Awake, but not participating in exam. Maintains eyes closed. Nonverbal, communicates by groaning/moaning. Does not follow commands  -Cranial nerves:   II: Unable to test VF, patient keeping eyes tightly shut  III, IV, VI: PERRLA b/l  V: Unable to test  VII: Face is apparent symmetric at rest  VIII: Hearing is bilaterally intact to finger rub  XII: Tongue protrudes midline, edematous  Motor: Increased tone throughout. Moving all extremities spontaneouly, 2/5  Rapid alternating movements intact and symmetric  Sensation: Intact to light touch bilaterally. No neglect or extinction on double simultaneous testing.  Coordination: No dysmetria on finger-to-nose and heel-to-shin bilaterally  Reflexes: Downgoing toes bilaterally   Gait: Narrow gait and steady    NIHSS: **** ASPECT Score: ***** ICH Score: ****** (GCS)    Fingerstick Blood Glucose: CAPILLARY BLOOD GLUCOSE        LABS:                        13.9   8.66  )-----------( 310      ( 11 Apr 2025 13:06 )             42.1     04-11    150[H]  |  110[H]  |  21  ----------------------------<  99  4.5   |  30  |  0.75    Ca    10.0      11 Apr 2025 13:06            Urinalysis Basic - ( 11 Apr 2025 13:06 )    Color: x / Appearance: x / SG: x / pH: x  Gluc: 99 mg/dL / Ketone: x  / Bili: x / Urobili: x   Blood: x / Protein: x / Nitrite: x   Leuk Esterase: x / RBC: x / WBC x   Sq Epi: x / Non Sq Epi: x / Bacteria: x        RADIOLOGY & ADDITIONAL STUDIES:      -----------------------------------------------------------------------------------------------------------------  IV-thrombolytic (Y/N):  No                 Reason thrombolytic not given: Isolated neurologic deficits not warranting additional points in NIHSS baseline score    **STROKE CODE CONSULT NOTE**    Last known well time/Time of onset of symptoms: 12:00PM    HPI: 60 y/o blind F with PMHx Down syndrome, cerebral palsy, discoid lupus presenting with tongue swelling. LKW 12:00PM 4/11/25. Per HHA, patient fell two weeks ago and injured her jaw, had a laceration in her tongue with placement of stitches (unclear location), and "everything was getting better then all of a sudden today her tongue swelled up" while eating lunch. HHA denies witnessing any jerking movements, incontinence, unclear to tell if staring episode as patient has eyes closed at baseline. Was brought to ED, was seen by ENT who proceeded to scope her (with administration of ketamine). Per ED team, report was that "loss of tone" was seen in her tongue during the scope and stroke should be on board, per ENT recs. Unable to obtain story from patient, as she is nonverbal. HHA states patient is currently at her baseline mental status, which is groaning/moaning, not following commands, eyes closed, leaning towards the R without interacting with others. States patient mostly spend the day on her wheelchair but will occasionally walk assisted. Per aid, patient was recently given new eye drop yesterday (does not know which eye drop). Denies any known hx of anaphylaxis. At time of examination, patient not fully participating but noted to have severe tongue swelling, initially appearing to be deviated to the right however corrected when patient was sat straight in the stretcher. Also noted to have swelling in b/l eyelid mucosa and erythema.    T(C): 36.3 (04-11-25 @ 12:53), Max: 36.3 (04-11-25 @ 12:53)  HR: 120 (04-11-25 @ 14:38) (104 - 122)  BP: 148/71 (04-11-25 @ 14:38) (97/58 - 156/83)  RR: 16 (04-11-25 @ 14:38) (16 - 22)  SpO2: 99% (04-11-25 @ 14:38) (93% - 100%)    PAST MEDICAL & SURGICAL HISTORY:  Down syndrome  Cerebral Palsy  Discoid lupus      SOCIAL HISTORY:   Patient has HHA  Unable to obtain rest of social history due to patient's baseline mental status    ROS: Unable to obtain    MEDICATIONS  (STANDING):  ketamine Injectable 40 milliGRAM(s) IV Push Once    MEDICATIONS  (PRN):    Allergies    citrus (Unknown)  No Known Drug Allergies    Intolerances      Vital Signs Last 24 Hrs  T(C): 36.3 (11 Apr 2025 12:53), Max: 36.3 (11 Apr 2025 12:53)  T(F): 97.3 (11 Apr 2025 12:53), Max: 97.3 (11 Apr 2025 12:53)  HR: 120 (11 Apr 2025 14:38) (104 - 122)  BP: 148/71 (11 Apr 2025 14:38) (97/58 - 156/83)  BP(mean): --  RR: 16 (11 Apr 2025 14:38) (16 - 22)  SpO2: 99% (11 Apr 2025 14:38) (93% - 100%)    Parameters below as of 11 Apr 2025 14:38  Patient On (Oxygen Delivery Method): nasal cannula  O2 Flow (L/min): 3      Physical exam:  Constitutional: Not participating in exam, groaning, eyes closed. Leaning to R on stretcher (baseline)  Eyes: White exudate noted on R eye. B/l mucosa edematous and erythematous  Tongue: Severely edematous. Midline  Neck: trachea midline  Cardiovascular: Regular rate and rhythm on monitor  Pulmonary: No use of accessory muscles  Extremities: no edema    Neurologic:  -Mental status: Awake, but not participating in exam. Maintains eyes closed and a contracted position. Nonverbal, communicates by groaning/moaning. Does not follow commands  -Cranial nerves:   II: Unable to test VF, patient keeping eyes tightly shut  III, IV, VI: PERRLA b/l - although exam limited by patient's actively trying to close eyes  V: Unable to test  VII: Face is apparent symmetric at rest  VIII: Hearing is bilaterally intact to finger rub  XII: Tongue protrudes midline, edematous  Motor: Increased tone throughout. Moving all extremities spontaneously, b/l UE 3/5, b/l LE 2/5  Sensation: Withdraws to noxious in all 4 extremities    NIHSS: 19    Fingerstick Blood Glucose: CAPILLARY BLOOD GLUCOSE        LABS:                        13.9   8.66  )-----------( 310      ( 11 Apr 2025 13:06 )             42.1     04-11    150[H]  |  110[H]  |  21  ----------------------------<  99  4.5   |  30  |  0.75    Ca    10.0      11 Apr 2025 13:06            Urinalysis Basic - ( 11 Apr 2025 13:06 )    Color: x / Appearance: x / SG: x / pH: x  Gluc: 99 mg/dL / Ketone: x  / Bili: x / Urobili: x   Blood: x / Protein: x / Nitrite: x   Leuk Esterase: x / RBC: x / WBC x   Sq Epi: x / Non Sq Epi: x / Bacteria: x        RADIOLOGY & ADDITIONAL STUDIES:    CT HEAD  IMPRESSION:  No acute intracranial hemorrhage, mass effect, or midline shift.    CT PERFUSION:  Technical limitations: None.    Core infarction: 0 ml  Penumbra / tissue at risk for active ischemia: 0 ml    CTA NECK:  No evidence of significant stenosis or occlusion.    CTA HEAD:  No large vessel occlusion, significant stenosis or vascular abnormality   identified.      -----------------------------------------------------------------------------------------------------------------  IV-thrombolytic (Y/N):  No                 Reason thrombolytic not given: Isolated neurologic deficits not warranting additional points in NIHSS baseline score

## 2025-04-12 DIAGNOSIS — T78.40XA ALLERGY, UNSPECIFIED, INITIAL ENCOUNTER: ICD-10-CM

## 2025-04-12 DIAGNOSIS — Q90.9 DOWN SYNDROME, UNSPECIFIED: ICD-10-CM

## 2025-04-12 DIAGNOSIS — Z86.69 PERSONAL HISTORY OF OTHER DISEASES OF THE NERVOUS SYSTEM AND SENSE ORGANS: ICD-10-CM

## 2025-04-12 DIAGNOSIS — L93.0 DISCOID LUPUS ERYTHEMATOSUS: ICD-10-CM

## 2025-04-12 DIAGNOSIS — Z29.9 ENCOUNTER FOR PROPHYLACTIC MEASURES, UNSPECIFIED: ICD-10-CM

## 2025-04-12 DIAGNOSIS — H10.9 UNSPECIFIED CONJUNCTIVITIS: ICD-10-CM

## 2025-04-12 LAB
ALBUMIN SERPL ELPH-MCNC: 3 G/DL — LOW (ref 3.3–5)
ALP SERPL-CCNC: 75 U/L — SIGNIFICANT CHANGE UP (ref 40–120)
ALT FLD-CCNC: 13 U/L — SIGNIFICANT CHANGE UP (ref 10–45)
ANION GAP SERPL CALC-SCNC: 11 MMOL/L — SIGNIFICANT CHANGE UP (ref 5–17)
ANTI-RIBONUCLEAR PROTEIN: 0.2 AI — SIGNIFICANT CHANGE UP
AST SERPL-CCNC: 17 U/L — SIGNIFICANT CHANGE UP (ref 10–40)
BASOPHILS # BLD AUTO: 0.03 K/UL — SIGNIFICANT CHANGE UP (ref 0–0.2)
BASOPHILS NFR BLD AUTO: 0.2 % — SIGNIFICANT CHANGE UP (ref 0–2)
BILIRUB SERPL-MCNC: 0.3 MG/DL — SIGNIFICANT CHANGE UP (ref 0.2–1.2)
BUN SERPL-MCNC: 16 MG/DL — SIGNIFICANT CHANGE UP (ref 7–23)
CALCIUM SERPL-MCNC: 9.2 MG/DL — SIGNIFICANT CHANGE UP (ref 8.4–10.5)
CHLORIDE SERPL-SCNC: 108 MMOL/L — SIGNIFICANT CHANGE UP (ref 96–108)
CO2 SERPL-SCNC: 22 MMOL/L — SIGNIFICANT CHANGE UP (ref 22–31)
CREAT SERPL-MCNC: 0.66 MG/DL — SIGNIFICANT CHANGE UP (ref 0.5–1.3)
DSDNA AB SER-ACNC: <1 IU/ML — SIGNIFICANT CHANGE UP
EGFR: 100 ML/MIN/1.73M2 — SIGNIFICANT CHANGE UP
EGFR: 100 ML/MIN/1.73M2 — SIGNIFICANT CHANGE UP
EOSINOPHIL # BLD AUTO: 0 K/UL — SIGNIFICANT CHANGE UP (ref 0–0.5)
EOSINOPHIL NFR BLD AUTO: 0 % — SIGNIFICANT CHANGE UP (ref 0–6)
FOLATE SERPL-MCNC: 11.7 NG/ML — SIGNIFICANT CHANGE UP
GLUCOSE SERPL-MCNC: 94 MG/DL — SIGNIFICANT CHANGE UP (ref 70–99)
HCT VFR BLD CALC: 41.4 % — SIGNIFICANT CHANGE UP (ref 34.5–45)
HGB BLD-MCNC: 13.6 G/DL — SIGNIFICANT CHANGE UP (ref 11.5–15.5)
IMM GRANULOCYTES NFR BLD AUTO: 0.5 % — SIGNIFICANT CHANGE UP (ref 0–0.9)
LYMPHOCYTES # BLD AUTO: 0.94 K/UL — LOW (ref 1–3.3)
LYMPHOCYTES # BLD AUTO: 7.7 % — LOW (ref 13–44)
MAGNESIUM SERPL-MCNC: 2.1 MG/DL — SIGNIFICANT CHANGE UP (ref 1.6–2.6)
MCHC RBC-ENTMCNC: 31.3 PG — SIGNIFICANT CHANGE UP (ref 27–34)
MCHC RBC-ENTMCNC: 32.9 G/DL — SIGNIFICANT CHANGE UP (ref 32–36)
MCV RBC AUTO: 95.2 FL — SIGNIFICANT CHANGE UP (ref 80–100)
MONOCYTES # BLD AUTO: 0.31 K/UL — SIGNIFICANT CHANGE UP (ref 0–0.9)
MONOCYTES NFR BLD AUTO: 2.5 % — SIGNIFICANT CHANGE UP (ref 2–14)
NEUTROPHILS # BLD AUTO: 10.89 K/UL — HIGH (ref 1.8–7.4)
NEUTROPHILS NFR BLD AUTO: 89.1 % — HIGH (ref 43–77)
NRBC BLD AUTO-RTO: 0 /100 WBCS — SIGNIFICANT CHANGE UP (ref 0–0)
PHOSPHATE SERPL-MCNC: 3.2 MG/DL — SIGNIFICANT CHANGE UP (ref 2.5–4.5)
PLATELET # BLD AUTO: 276 K/UL — SIGNIFICANT CHANGE UP (ref 150–400)
POTASSIUM SERPL-MCNC: 4.5 MMOL/L — SIGNIFICANT CHANGE UP (ref 3.5–5.3)
POTASSIUM SERPL-SCNC: 4.5 MMOL/L — SIGNIFICANT CHANGE UP (ref 3.5–5.3)
PROT SERPL-MCNC: 7 G/DL — SIGNIFICANT CHANGE UP (ref 6–8.3)
RBC # BLD: 4.35 M/UL — SIGNIFICANT CHANGE UP (ref 3.8–5.2)
RBC # FLD: 15.1 % — HIGH (ref 10.3–14.5)
SODIUM SERPL-SCNC: 141 MMOL/L — SIGNIFICANT CHANGE UP (ref 135–145)
T4 FREE+ TSH PNL SERPL: 0.96 UIU/ML — SIGNIFICANT CHANGE UP (ref 0.27–4.2)
VIT B12 SERPL-MCNC: 429 PG/ML — SIGNIFICANT CHANGE UP (ref 232–1245)
WBC # BLD: 12.23 K/UL — HIGH (ref 3.8–10.5)
WBC # FLD AUTO: 12.23 K/UL — HIGH (ref 3.8–10.5)

## 2025-04-12 PROCEDURE — 99233 SBSQ HOSP IP/OBS HIGH 50: CPT | Mod: GC

## 2025-04-12 PROCEDURE — 99222 1ST HOSP IP/OBS MODERATE 55: CPT

## 2025-04-12 RX ORDER — OLANZAPINE 10 MG/1
2.5 TABLET ORAL AT BEDTIME
Refills: 0 | Status: DISCONTINUED | OUTPATIENT
Start: 2025-04-13 | End: 2025-04-14

## 2025-04-12 RX ORDER — ENOXAPARIN SODIUM 100 MG/ML
30 INJECTION SUBCUTANEOUS EVERY 24 HOURS
Refills: 0 | Status: DISCONTINUED | OUTPATIENT
Start: 2025-04-12 | End: 2025-04-14

## 2025-04-12 RX ORDER — ERYTHROMYCIN 5 MG/G
1 OINTMENT OPHTHALMIC THREE TIMES A DAY
Refills: 0 | Status: DISCONTINUED | OUTPATIENT
Start: 2025-04-12 | End: 2025-04-14

## 2025-04-12 RX ORDER — ENOXAPARIN SODIUM 100 MG/ML
40 INJECTION SUBCUTANEOUS EVERY 24 HOURS
Refills: 0 | Status: DISCONTINUED | OUTPATIENT
Start: 2025-04-12 | End: 2025-04-12

## 2025-04-12 RX ADMIN — ERYTHROMYCIN 1 APPLICATION(S): 5 OINTMENT OPHTHALMIC at 23:44

## 2025-04-12 RX ADMIN — ERYTHROMYCIN 1 APPLICATION(S): 5 OINTMENT OPHTHALMIC at 14:49

## 2025-04-12 RX ADMIN — Medication 1 APPLICATION(S): at 05:53

## 2025-04-12 NOTE — PROGRESS NOTE ADULT - SUBJECTIVE AND OBJECTIVE BOX
**ACCEPTANCE FROM MICU TO [facility]**    Hospital Course:   Garett is a 59-year-old blind F with PMHx Down syndrome, cerebral palsy, discoid lupus presenting with tongue swelling. LKW 12:00PM 4/11/25. Per HHA, patient fell two weeks ago and injured her jaw, had a laceration in her tongue with placement of stitches (unclear location), and "everything was getting better then all of a sudden today her tongue swelled up" while eating lunch. Patient received epinephrine and benadryl in the ED, and was scoped by ENT - no concern for swelling and patient was admitted to MICU for airway monitoring. Lip and tongue swelling improved, erythromycin ointment started for bacterial conjunctivitis, and patient stable for further management on F.   SUBJECTIVE / INTERVAL HPI: Patient seen and examined at bedside.     VITAL SIGNS:  Vital Signs Last 24 Hrs  T(C): 36.5 (12 Apr 2025 13:30), Max: 37.8 (11 Apr 2025 17:30)  T(F): 97.7 (12 Apr 2025 13:30), Max: 100.1 (11 Apr 2025 17:30)  HR: 84 (12 Apr 2025 14:00) (64 - 111)  BP: 124/81 (12 Apr 2025 14:00) (80/49 - 144/85)  BP(mean): 91 (12 Apr 2025 14:00) (59 - 91)  RR: 20 (12 Apr 2025 14:00) (12 - 34)  SpO2: 95% (12 Apr 2025 14:00) (92% - 100%)    Parameters below as of 12 Apr 2025 14:00  Patient On (Oxygen Delivery Method): room air        PHYSICAL EXAM:    General: A&Ox0; NAD  Head: NC/AT; MMM; improved lip swelling; bilateral eyelid crusting with 2+ conjunctival injection   Neck: Supple; no JVD  Respiratory: CTA B/L; no wheezes/crackles   Cardiovascular: Regular rhythm/rate; S1/S2   Gastrointestinal: Soft; NTND; normoactive BS  Extremities: WWP; no edema/cyanosis  Neurological: CNII-XII grossly intact; no obvious focal deficits    MEDICATIONS:  MEDICATIONS  (STANDING):  chlorhexidine 2% Cloths 1 Application(s) Topical <User Schedule>  enoxaparin Injectable 30 milliGRAM(s) SubCutaneous every 24 hours  erythromycin   Ointment 1 Application(s) Both EYES three times a day    MEDICATIONS  (PRN):      ALLERGIES:  Allergies    citrus (Unknown)  No Known Drug Allergies    Intolerances        LABS:                        13.6   12.23 )-----------( 276      ( 12 Apr 2025 05:11 )             41.4     04-12    141  |  108  |  16  ----------------------------<  94  4.5   |  22  |  0.66    Ca    9.2      12 Apr 2025 05:11  Phos  3.2     04-12  Mg     2.1     04-12    TPro  7.0  /  Alb  3.0[L]  /  TBili  0.3  /  DBili  x   /  AST  17  /  ALT  13  /  AlkPhos  75  04-12    PT/INR - ( 11 Apr 2025 14:00 )   PT: 13.9 sec;   INR: 1.19          PTT - ( 11 Apr 2025 14:00 )  PTT:22.3 sec  Urinalysis Basic - ( 12 Apr 2025 05:11 )    Color: x / Appearance: x / SG: x / pH: x  Gluc: 94 mg/dL / Ketone: x  / Bili: x / Urobili: x   Blood: x / Protein: x / Nitrite: x   Leuk Esterase: x / RBC: x / WBC x   Sq Epi: x / Non Sq Epi: x / Bacteria: x      CAPILLARY BLOOD GLUCOSE      POCT Blood Glucose.: 84 mg/dL (12 Apr 2025 11:03)      RADIOLOGY & ADDITIONAL TESTS: Reviewed. **ACCEPTANCE FROM MICU TO [facility]**    Hospital Course:   Garett is a 59-year-old blind F with PMHx Down syndrome, cerebral palsy, discoid lupus presenting with tongue swelling. LKW 12:00PM 4/11/25. Per HHA, patient fell two weeks ago and injured her jaw, had a laceration in her tongue with placement of stitches (unclear location), and "everything was getting better then all of a sudden today her tongue swelled up" while eating lunch. Patient received epinephrine and benadryl in the ED, and was scoped by ENT - no concern for swelling and patient was admitted to MICU for airway monitoring. Lip and tongue swelling improved, erythromycin ointment started for bacterial conjunctivitis, and patient stable for further management on F.     SUBJECTIVE / INTERVAL HPI: Patient seen and examined at bedside. Resting comfortably, in no acute distress. Unable to complete ROS due to mental status.     VITAL SIGNS:  Vital Signs Last 24 Hrs  T(C): 36.5 (12 Apr 2025 13:30), Max: 37.8 (11 Apr 2025 17:30)  T(F): 97.7 (12 Apr 2025 13:30), Max: 100.1 (11 Apr 2025 17:30)  HR: 84 (12 Apr 2025 14:00) (64 - 111)  BP: 124/81 (12 Apr 2025 14:00) (80/49 - 144/85)  BP(mean): 91 (12 Apr 2025 14:00) (59 - 91)  RR: 20 (12 Apr 2025 14:00) (12 - 34)  SpO2: 95% (12 Apr 2025 14:00) (92% - 100%)    Parameters below as of 12 Apr 2025 14:00  Patient On (Oxygen Delivery Method): room air        PHYSICAL EXAM:    General: A&Ox0; NAD  Head: NC/AT; improved lip swelling; bilateral eyelid crusting with 2+ conjunctival injection   Neck: Supple; no JVD  Respiratory: CTA B/L; no wheezes/crackles   Cardiovascular: Regular rhythm/rate; S1/S2   Gastrointestinal: Soft; NTND; normoactive BS  Extremities: WWP; no edema/cyanosis  Neurological: CNII-XII grossly intact; no obvious focal deficits    MEDICATIONS:  MEDICATIONS  (STANDING):  chlorhexidine 2% Cloths 1 Application(s) Topical <User Schedule>  enoxaparin Injectable 30 milliGRAM(s) SubCutaneous every 24 hours  erythromycin   Ointment 1 Application(s) Both EYES three times a day    MEDICATIONS  (PRN):      ALLERGIES:  Allergies    citrus (Unknown)  No Known Drug Allergies    Intolerances        LABS:                        13.6   12.23 )-----------( 276      ( 12 Apr 2025 05:11 )             41.4     04-12    141  |  108  |  16  ----------------------------<  94  4.5   |  22  |  0.66    Ca    9.2      12 Apr 2025 05:11  Phos  3.2     04-12  Mg     2.1     04-12    TPro  7.0  /  Alb  3.0[L]  /  TBili  0.3  /  DBili  x   /  AST  17  /  ALT  13  /  AlkPhos  75  04-12    PT/INR - ( 11 Apr 2025 14:00 )   PT: 13.9 sec;   INR: 1.19          PTT - ( 11 Apr 2025 14:00 )  PTT:22.3 sec  Urinalysis Basic - ( 12 Apr 2025 05:11 )    Color: x / Appearance: x / SG: x / pH: x  Gluc: 94 mg/dL / Ketone: x  / Bili: x / Urobili: x   Blood: x / Protein: x / Nitrite: x   Leuk Esterase: x / RBC: x / WBC x   Sq Epi: x / Non Sq Epi: x / Bacteria: x      CAPILLARY BLOOD GLUCOSE      POCT Blood Glucose.: 84 mg/dL (12 Apr 2025 11:03)      RADIOLOGY & ADDITIONAL TESTS: Reviewed.

## 2025-04-12 NOTE — DIETITIAN INITIAL EVALUATION ADULT - REASON INDICATOR FOR ASSESSMENT
Nutrition consult warranted for: Malnutrition Screening Tool  score 2 or >    Chart reviewed, events noted.

## 2025-04-12 NOTE — PROGRESS NOTE ADULT - PROBLEM SELECTOR PLAN 6
F: None  E: replete as necessary  N: NPO  DVT: Lovenox  Dispo: F F: None  E: replete as necessary  N: pureed diet  DVT: Lovenox  Dispo: RMF

## 2025-04-12 NOTE — PROGRESS NOTE ADULT - PROBLEM SELECTOR PLAN 1
Per health aid, swelling increased from baseline. Received epinephrine and steroids in the ED. ENT scope without concern for swelling of the airway. Given bilateral chemosis and rx of allergic eyedrops, patient may have had exposure to allergen prior to today. In list of home meds, tacrolimus cream for discoid lupus, which may cause angioedema, and could have accidentally ingested. Otherwise patient is in group home, and may have been exposed medication for other patient. Less likely hereditary angioedema, though reasonable to work up. Less likely psychogenic, though patient is on olanzapine, atypical antipsychotic, and low likelihood for tardive dyskinesia.  - f/u C1 esterase inhibtitor, C1Q, C3 and C4 complement  - ent consulted, f/u recs  - hold further epinephrine and steroids for now  - monitor airway  - aspiration precautions Per health aid, swelling increased from baseline. Received epinephrine and steroids in the ED. ENT scope without concern for swelling of the airway. In list of home meds, tacrolimus cream for discoid lupus, which may cause angioedema, and could have accidentally ingested. Otherwise patient is in group home, and may have been exposed medication for other patient. Less likely hereditary angioedema, though reasonable to work up. Less likely psychogenic, though patient is on olanzapine, atypical antipsychotic, and low likelihood for tardive dyskinesia. Improvement in tongue swelling after epinephrine and steroids.   - f/u C1 esterase inhibitor, C1Q, C3 and C4 complement  - ent recs  - monitor airway  - aspiration precautions

## 2025-04-12 NOTE — PROGRESS NOTE ADULT - SUBJECTIVE AND OBJECTIVE BOX
O/N Events:  Subjective/ROS: Denies HA, CP, SOB, n/v, changes in bowel/urinary habits.  12pt ROS otherwise negative.    VITALS  Vital Signs Last 12 Hrs  T(F): 97 (04-12-25 @ 09:21), Max: 97.1 (04-12-25 @ 06:05)  HR: 82 (04-12-25 @ 11:00) (64 - 97)  BP: 96/66 (04-12-25 @ 11:00) (90/55 - 110/65)  BP(mean): 77 (04-12-25 @ 11:00) (70 - 86)  RR: 13 (04-12-25 @ 11:00) (12 - 34)  SpO2: 92% (04-12-25 @ 11:00) (92% - 100%)  I&O's Summary    11 Apr 2025 07:01  -  12 Apr 2025 07:00  --------------------------------------------------------  IN: 1000 mL / OUT: 0 mL / NET: 1000 mL        PHYSICAL EXAM  General: A&Ox3; NAD  Head: NC/AT; MMM; PERRL; EOMI;  Neck: Supple; no JVD  Respiratory: CTA B/L; no wheezes/crackles   Cardiovascular: Regular rhythm/rate; S1/S2   Gastrointestinal: Soft; NTND; normoactive BS  Extremities: WWP; no edema/cyanosis  Neurological:  CNII-XII grossly intact; no obvious focal deficits    MEDICATIONS  (STANDING):  chlorhexidine 2% Cloths 1 Application(s) Topical <User Schedule>    MEDICATIONS  (PRN):      citrus (Unknown)  No Known Drug Allergies      LABS                        13.6   12.23 )-----------( 276      ( 12 Apr 2025 05:11 )             41.4     04-12    141  |  108  |  16  ----------------------------<  94  4.5   |  22  |  0.66    Ca    9.2      12 Apr 2025 05:11  Phos  3.2     04-12  Mg     2.1     04-12    TPro  7.0  /  Alb  3.0[L]  /  TBili  0.3  /  DBili  x   /  AST  17  /  ALT  13  /  AlkPhos  75  04-12    PT/INR - ( 11 Apr 2025 14:00 )   PT: 13.9 sec;   INR: 1.19          PTT - ( 11 Apr 2025 14:00 )  PTT:22.3 sec  Urinalysis Basic - ( 12 Apr 2025 05:11 )    Color: x / Appearance: x / SG: x / pH: x  Gluc: 94 mg/dL / Ketone: x  / Bili: x / Urobili: x   Blood: x / Protein: x / Nitrite: x   Leuk Esterase: x / RBC: x / WBC x   Sq Epi: x / Non Sq Epi: x / Bacteria: x            IMAGING/EKG/ETC  EKG:  Xray:  CT:  MRI: _________________________TRANSFER MICU to [facility]_________________________    Hospital Course:   Garett is a 59-year-old blind F with PMHx Down syndrome, cerebral palsy, discoid lupus presenting with tongue swelling. LKW 12:00PM 4/11/25. Per HHA, patient fell two weeks ago and injured her jaw, had a laceration in her tongue with placement of stitches (unclear location), and "everything was getting better then all of a sudden today her tongue swelled up" while eating lunch. Patient received epinephrine and benadryl in the ED, and was scoped by ENT - no concern for swelling and patient was admitted to MICU for airway monitoring. Lip and tongue swelling improved, erythromycin ointment started for bacterial conjunctivitis, and patient stable for further management on [facility].     O/N Events:  NAEON    Subjective/ROS:   Patient seen and examined at bedside, non-verbal at baseline. Patient was moving extremities spontaneously and squeezing hand. Opened eyes when asked and likes to repeat the word "coffee".     VITALS  Vital Signs Last 12 Hrs  T(F): 97 (04-12-25 @ 09:21), Max: 97.1 (04-12-25 @ 06:05)  HR: 82 (04-12-25 @ 11:00) (64 - 97)  BP: 96/66 (04-12-25 @ 11:00) (90/55 - 110/65)  BP(mean): 77 (04-12-25 @ 11:00) (70 - 86)  RR: 13 (04-12-25 @ 11:00) (12 - 34)  SpO2: 92% (04-12-25 @ 11:00) (92% - 100%)  I&O's Summary    11 Apr 2025 07:01  -  12 Apr 2025 07:00  --------------------------------------------------------  IN: 1000 mL / OUT: 0 mL / NET: 1000 mL        PHYSICAL EXAM  General: A&Ox0; NAD  Head: NC/AT; MMM; improved lip swelling; bilateral eyelid crusting with 2+ conjunctival injection   Neck: Supple; no JVD  Respiratory: CTA B/L; no wheezes/crackles   Cardiovascular: Regular rhythm/rate; S1/S2   Gastrointestinal: Soft; NTND; normoactive BS  Extremities: WWP; no edema/cyanosis  Neurological: CNII-XII grossly intact; no obvious focal deficits    MEDICATIONS  (STANDING):  chlorhexidine 2% Cloths 1 Application(s) Topical <User Schedule>    MEDICATIONS  (PRN):      citrus (Unknown)  No Known Drug Allergies      LABS                        13.6   12.23 )-----------( 276      ( 12 Apr 2025 05:11 )             41.4     04-12    141  |  108  |  16  ----------------------------<  94  4.5   |  22  |  0.66    Ca    9.2      12 Apr 2025 05:11  Phos  3.2     04-12  Mg     2.1     04-12    TPro  7.0  /  Alb  3.0[L]  /  TBili  0.3  /  DBili  x   /  AST  17  /  ALT  13  /  AlkPhos  75  04-12    PT/INR - ( 11 Apr 2025 14:00 )   PT: 13.9 sec;   INR: 1.19          PTT - ( 11 Apr 2025 14:00 )  PTT:22.3 sec  Urinalysis Basic - ( 12 Apr 2025 05:11 )    Color: x / Appearance: x / SG: x / pH: x  Gluc: 94 mg/dL / Ketone: x  / Bili: x / Urobili: x   Blood: x / Protein: x / Nitrite: x   Leuk Esterase: x / RBC: x / WBC x   Sq Epi: x / Non Sq Epi: x / Bacteria: x            IMAGING/EKG/ETC: Reviewed.

## 2025-04-12 NOTE — PROGRESS NOTE ADULT - ASSESSMENT
58 y/o blind F with PMHx down syndrome, discoid lupus presenting with tongue swelling. ICU consulted for airway monitoring    NEURO  Unknown baseline, though responds to name. Received ketamine in ED. Home med olanzapine 2.5 and trazodone. Stroke code in ED for decrease tongue tone.  - reassess mental status when more awake  - hold home sedatives  - f/u stroke recs    HEENT  #C/f angioedema  #Oropharyngeal swelling  Per health aid, swelling increased from baseline. Received epinephrine and steroids in the ED. ENT scope without concern for swelling of the airway. Given bilateral chemosis and rx of allergic eyedrops, patient may have had exposure to allergen prior to today. In list of home meds, tacrolimus cream for discoid lupus, which may cause angioedema, and could have accidentally ingested. Otherwise patient is in group home, and may have been exposed medication for other patient. Less likely hereditary angioedema, though reasonable to work up. Less likely psychogenic, though patient is on olanzapine, atypical antipsychotic, and low likelihood for tardive dyskinesia.    Plan  - f/u C1 esterase inhibtitor, C1Q, C3 and C4 complement  - ent consulted, f/u recs  - hold further epinephrine and steroids for now  - monitor airway  - aspiration precautions  - deep suctioning    CARDIO  #Tachycardia  Heart rates in the 110s. Likely iso of hypovolemia vs agitation  - continue to monitor    PULM  #AHRF  Currently on 3L, no episode of hypoxemia, and saturating well at 198 - 100%  - wean as tolerated    GI  #NPO  - q6 finger sticks    RENAL/  #Hypernatremia  Euvolemic, given 1L in the ED. Will recommend to give 1L dextrose  - give d5 1L bolus    ID  No active issues.    HEME/ONC  No active issues.    Rheumatologic  #Discoid lupus  Home meds tacrolimus .1% cream, ciclopirox 1%, betametah dip aug, and mometasone  - hold for now until angioedema resolves    DISPO  F: 1L  E: replenish prn  N: npo  GI PPX: none  DVT PPX: lovenox  DISPO: MICU     60 y/o blind F with PMHx down syndrome, discoid lupus presenting with tongue swelling. ICU consulted for airway monitoring    NEURO  Unknown baseline, though responds to name. Received ketamine in ED. Home med olanzapine 2.5 and trazodone. Stroke code in ED for decrease tongue tone.  - reassess mental status when more awake  - hold home sedatives, resume as appropriate   - f/u stroke recs    HEENT  #C/f angioedema  #Oropharyngeal swelling  Per health aid, swelling increased from baseline. Received epinephrine and steroids in the ED. ENT scope without concern for swelling of the airway. Given bilateral chemosis and rx of allergic eyedrops, patient may have had exposure to allergen prior to today. In list of home meds, tacrolimus cream for discoid lupus, which may cause angioedema, and could have accidentally ingested. Otherwise patient is in group home, and may have been exposed medication for other patient. Less likely hereditary angioedema, though reasonable to work up. Less likely psychogenic, though patient is on olanzapine, atypical antipsychotic, and low likelihood for tardive dyskinesia.  - f/u C1 esterase inhibtitor, C1Q, C3 and C4 complement  - ent consulted, f/u recs  - hold further epinephrine and steroids for now  - monitor airway  - aspiration precautions    CARDIO  #Tachycardia  Heart rates in the 110s. Likely iso of hypovolemia vs agitation  - continue to monitor    PULM  #AHRF  Currently on 3L, no episode of hypoxemia, and saturating well at 98 - 100%  - wean as tolerated    GI  #NPO  - q6 finger sticks  - bedside dysphagia, advance diet as tolerated (purreed)    RENAL/  #Hypernatremia  Euvolemic, given 1L in the ED. Will recommend to give 1L dextrose. S/p d5 1L bolus. RESOLVED    ID  No active issues.    HEME/ONC  No active issues.    Rheumatologic  #Discoid lupus  Home meds tacrolimus .1% cream, ciclopirox 1%, betametah dip aug, and mometasone  - hold for now until angioedema resolves    DISPO  F: 1L  E: replenish prn  N: npo  GI PPX: none  DVT PPX: lovenox  DISPO: MICU     58 y/o blind F with PMHx down syndrome, discoid lupus presenting with tongue swelling. ICU consulted for airway monitoring    NEURO  Unknown baseline, though responds to name. Received ketamine in ED. Home med olanzapine 2.5 and trazodone. Stroke code in ED for decrease tongue tone.  - reassess mental status when more awake  - hold home sedatives, resume as appropriate   - f/u stroke recs    HEENT  #C/f angioedema  #Oropharyngeal swelling  Per health aid, swelling increased from baseline. Received epinephrine and steroids in the ED. ENT scope without concern for swelling of the airway. Given bilateral chemosis and rx of allergic eyedrops, patient may have had exposure to allergen prior to today. In list of home meds, tacrolimus cream for discoid lupus, which may cause angioedema, and could have accidentally ingested. Otherwise patient is in group home, and may have been exposed medication for other patient. Less likely hereditary angioedema, though reasonable to work up. Less likely psychogenic, though patient is on olanzapine, atypical antipsychotic, and low likelihood for tardive dyskinesia.  - f/u C1 esterase inhibtitor, C1Q, C3 and C4 complement  - ent consulted, f/u recs  - hold further epinephrine and steroids for now  - monitor airway  - aspiration precautions    #Bacterial conjunctivitis   Per aid from facility, patient had a telehealth visit 2 days ago for redness of eyes. Patient is blind and she was prescribed a medication for "pink eye" but never received it yet. On exam, patient has bilateral eyelid crusting with 2+ conjunctival injection, PERRLA, clear corneas OU.  - started erythromycin ointment TID     CARDIO  #Tachycardia  Heart rates in the 110s. Likely iso of hypovolemia vs agitation  - continue to monitor    PULM  #AHRF  Currently on 3L, no episode of hypoxemia, and saturating well at 98 - 100%  - wean as tolerated    GI  #NPO  - q6 finger sticks  - bedside dysphagia, advance diet as tolerated (purreed)    RENAL/  #Hypernatremia  Euvolemic, given 1L in the ED. Will recommend to give 1L dextrose. S/p d5 1L bolus. RESOLVED    ID  No active issues.    HEME/ONC  No active issues.    Rheumatologic  #Discoid lupus  Home meds tacrolimus .1% cream, ciclopirox 1%, betametah dip aug, and mometasone  - hold for now until angioedema resolves    DISPO  F: 1L  E: replenish prn  N: npo  GI PPX: none  DVT PPX: lovenox  DISPO: MICU     60 y/o blind F with PMHx down syndrome, discoid lupus presenting with tongue swelling. ICU consulted for airway monitoring    NEURO  Unknown baseline, though responds to name. Received ketamine in ED. Home med olanzapine 2.5 and trazodone. Stroke code in ED for decrease tongue tone.  - reassess mental status when more awake  - hold home sedatives, resume as appropriate   - f/u stroke recs    HEENT  #C/f angioedema  #Oropharyngeal swelling  Per health aid, swelling increased from baseline. Received epinephrine and steroids in the ED. ENT scope without concern for swelling of the airway. In list of home meds, tacrolimus cream for discoid lupus, which may cause angioedema, and could have accidentally ingested. Otherwise patient is in group home, and may have been exposed medication for other patient. Less likely hereditary angioedema, though reasonable to work up. Less likely psychogenic, though patient is on olanzapine, atypical antipsychotic, and low likelihood for tardive dyskinesia.  - f/u C1 esterase inhibtitor, C1Q, C3 and C4 complement  - ent consulted, f/u recs  - hold further epinephrine and steroids for now  - monitor airway  - aspiration precautions    #Bacterial conjunctivitis   Per aid from facility, patient had a telehealth visit 2 days ago for redness of eyes. Patient is blind and she was prescribed a medication for "pink eye" but never received it yet. On exam, patient has bilateral eyelid crusting with 2+ conjunctival injection, PERRLA, clear corneas OU.  - started erythromycin ointment TID     CARDIO  #Tachycardia  Heart rates in the 110s. Likely iso of hypovolemia vs agitation  - continue to monitor    PULM  #AHRF  Currently on 3L, no episode of hypoxemia, and saturating well at 98 - 100%  - wean as tolerated    GI  #NPO  - q6 finger sticks  - bedside dysphagia, advance diet as tolerated (purreed)    RENAL/  #Hypernatremia  Euvolemic, given 1L in the ED. Will recommend to give 1L dextrose. S/p d5 1L bolus. RESOLVED    ID  No active issues.    HEME/ONC  No active issues.    Rheumatologic  #Discoid lupus  Home meds tacrolimus .1% cream, ciclopirox 1%, betametah dip aug, and mometasone  - hold for now until angioedema resolves    DISPO  F: 1L  E: replenish prn  N: npo  GI PPX: none  DVT PPX: lovenox  DISPO: MICU

## 2025-04-12 NOTE — DIETITIAN INITIAL EVALUATION ADULT - NUTRITIONGOAL OUTCOME1
Pt to meet >75% of estimated nutrition needs daily per course of hospitalization when medically feasible.

## 2025-04-12 NOTE — DIETITIAN NUTRITION RISK NOTIFICATION - ADDITIONAL COMMENTS/DIETITIAN RECOMMENDATIONS
Visited pt at bedside on 7EA for initial assessment. Pt nonverbal, unable to participate in nutrition interview. Current diet: NPO, as pt has tongue swelling as pt is high risk for aspiration. Noted with citrus allergy per charts. Dosing weight 69 pounds, IBW: 76 pounds, %IBW: 90%. Observed with Severe depletions: temples, orbital, buccal, shoulder, clavicle per flow sheets. Based on ASPEN guidelines, pt meets criteria for malnutrition. 0/10 pain per flow sheets. No Pressure Injuries per flow sheets. Jaxon Score: 16, No Edema per flow sheets. Abdominal flat, contour symmetrical, soft/nontender, audible and normoactive bowel sounds per flow sheets. Meds reviewed. Nutritionally Pertinent Labs 4/12 POCT ranges 84-97 past 24 hours. Other labs WNL. See Nutrition recommendations below.    **as medically feasible, advance diet as tolerated to regular diet, provide nourishments and/or oral nutrition supplements per pt preference  >>>Consider SLP evaluation to formally assess chewing/swallow capabilities   **IF pt's diet is unable to be advanced, recommend that team consider alternate means of nutrition (enteral nutrition) ** please consult dietitians for recommendations**   2. Monitor weight trends, labs, and skin integrity and bowel movement regularity.   3. Nutrition care plan to remain consistent with GOC   4. RDN will continue to monitor, reassess, and intervene as appropriate.

## 2025-04-12 NOTE — DIETITIAN INITIAL EVALUATION ADULT - ADD RECOMMEND
1. NPO  **as medically feasible, advance diet as tolerated to regular diet, provide nourishments and/or oral nutrition supplements per pt preference  >>>Consider SLP evaluation to formally assess chewing/swallow capabilities   **IF pt's diet is unable to be advanced, recommend that team consider alternate means of nutrition (enteral nutrition) ** please consult dietitians for recommendations**   2. Monitor weight trends, labs, and skin integrity and bowel movement regularity.   3. Nutrition care plan to remain consistent with GOC   4. RDN will continue to monitor, reassess, and intervene as appropriate.

## 2025-04-12 NOTE — DIETITIAN INITIAL EVALUATION ADULT - MALNUTRITION
Pts mother states fell at school and is now having pain in right ankle when standing or walking.      Khadijah Carlin  03/31/23 [id number]
Severe Chronic Malnutrition

## 2025-04-12 NOTE — DIETITIAN INITIAL EVALUATION ADULT - PERTINENT MEDS FT
MEDICATIONS  (STANDING):  chlorhexidine 2% Cloths 1 Application(s) Topical <User Schedule>    MEDICATIONS  (PRN):

## 2025-04-12 NOTE — DIETITIAN INITIAL EVALUATION ADULT - OTHER INFO
"58 y/o blind F with PMHx down syndrome, discoid lupus presenting with tongue swelling. ICU consulted for airway monitoring"     Visited pt at bedside on 7EA for initial assessment. Pt nonverbal, unable to participate in nutrition interview. Current diet: NPO, as pt has tongue swelling as pt is high risk for aspiration. Noted with citrus allergy per charts. Dosing weight 69 pounds, IBW: 76 pounds, %IBW: 90%. Observed with Severe depletions: temples, orbital, buccal, shoulder, clavicle per flow sheets. Based on ASPEN guidelines, pt meets criteria for malnutrition. 0/10 pain per flow sheets. No Pressure Injuries per flow sheets. Jaxon Score: 16, No Edema per flow sheets. Abdominal flat, contour symmetrical, soft/nontender, audible and normoactive bowel sounds per flow sheets. Meds reviewed. Nutritionally Pertinent Labs 4/12 POCT ranges 84-97 past 24 hours. Other labs WNL. See Nutrition recommendations below.

## 2025-04-12 NOTE — DIETITIAN INITIAL EVALUATION ADULT - PERTINENT LABORATORY DATA
04-12    141  |  108  |  16  ----------------------------<  94  4.5   |  22  |  0.66    Ca    9.2      12 Apr 2025 05:11  Phos  3.2     04-12  Mg     2.1     04-12    TPro  7.0  /  Alb  3.0[L]  /  TBili  0.3  /  DBili  x   /  AST  17  /  ALT  13  /  AlkPhos  75  04-12  POCT Blood Glucose.: 97 mg/dL (04-12-25 @ 05:25)

## 2025-04-12 NOTE — DIETITIAN INITIAL EVALUATION ADULT - OTHER CALCULATIONS
Actual body weight (31.3kg) used to estimate needs as patient is <100% (90%) of [place] BW. Needs adjusted for advanced age, malnutrition, clinical course.

## 2025-04-12 NOTE — PROGRESS NOTE ADULT - PROBLEM SELECTOR PLAN 3
Unknown baseline, though responds to name. Received ketamine in ED. Home med olanzapine 2.5 and trazodone. Stroke code in ED for decrease tongue tone determined to have lose of tone described 2/2 tongue edema.  - collateral from group home on baseline Currently at baseline mental status. Received ketamine in ED. Home med olanzapine 2.5 and trazodone. Stroke code in ED for decrease tongue tone determined to have lose of tone described 2/2 tongue edema.  - restart home meds- olanzapine 2.5mg

## 2025-04-12 NOTE — PROGRESS NOTE ADULT - ASSESSMENT
58 y/o blind F with PMHx down syndrome, discoid lupus presenting with tongue swelling. ICU consulted for airway monitoring, s/p ENT scope without signs of airway swelling, improvement in tongue swelling, transferred to [facility] for further monitoring.

## 2025-04-12 NOTE — PROGRESS NOTE ADULT - PROBLEM SELECTOR PLAN 4
Home meds tacrolimus .1% cream, ciclopirox 1%, betametah dip aug, and mometasone  - hold for now until angioedema resolves Home meds tacrolimus .1% cream, ciclopirox 1%, and mometasone  - hold for now until angioedema resolves

## 2025-04-13 LAB
ALBUMIN SERPL ELPH-MCNC: 3 G/DL — LOW (ref 3.3–5)
ALP SERPL-CCNC: 80 U/L — SIGNIFICANT CHANGE UP (ref 40–120)
ALT FLD-CCNC: 15 U/L — SIGNIFICANT CHANGE UP (ref 10–45)
ANION GAP SERPL CALC-SCNC: 15 MMOL/L — SIGNIFICANT CHANGE UP (ref 5–17)
AST SERPL-CCNC: 22 U/L — SIGNIFICANT CHANGE UP (ref 10–40)
BILIRUB SERPL-MCNC: 0.2 MG/DL — SIGNIFICANT CHANGE UP (ref 0.2–1.2)
BLD GP AB SCN SERPL QL: NEGATIVE — SIGNIFICANT CHANGE UP
BUN SERPL-MCNC: 17 MG/DL — SIGNIFICANT CHANGE UP (ref 7–23)
CALCIUM SERPL-MCNC: 8.7 MG/DL — SIGNIFICANT CHANGE UP (ref 8.4–10.5)
CHLORIDE SERPL-SCNC: 99 MMOL/L — SIGNIFICANT CHANGE UP (ref 96–108)
CO2 SERPL-SCNC: 24 MMOL/L — SIGNIFICANT CHANGE UP (ref 22–31)
CREAT SERPL-MCNC: 0.66 MG/DL — SIGNIFICANT CHANGE UP (ref 0.5–1.3)
EGFR: 100 ML/MIN/1.73M2 — SIGNIFICANT CHANGE UP
EGFR: 100 ML/MIN/1.73M2 — SIGNIFICANT CHANGE UP
GLUCOSE SERPL-MCNC: 132 MG/DL — HIGH (ref 70–99)
HCT VFR BLD CALC: 43.8 % — SIGNIFICANT CHANGE UP (ref 34.5–45)
HGB BLD-MCNC: 14.8 G/DL — SIGNIFICANT CHANGE UP (ref 11.5–15.5)
MAGNESIUM SERPL-MCNC: 2 MG/DL — SIGNIFICANT CHANGE UP (ref 1.6–2.6)
MCHC RBC-ENTMCNC: 31.2 PG — SIGNIFICANT CHANGE UP (ref 27–34)
MCHC RBC-ENTMCNC: 33.8 G/DL — SIGNIFICANT CHANGE UP (ref 32–36)
MCV RBC AUTO: 92.2 FL — SIGNIFICANT CHANGE UP (ref 80–100)
NRBC BLD AUTO-RTO: 0 /100 WBCS — SIGNIFICANT CHANGE UP (ref 0–0)
PHOSPHATE SERPL-MCNC: 2.2 MG/DL — LOW (ref 2.5–4.5)
PLATELET # BLD AUTO: 318 K/UL — SIGNIFICANT CHANGE UP (ref 150–400)
POTASSIUM SERPL-MCNC: 4 MMOL/L — SIGNIFICANT CHANGE UP (ref 3.5–5.3)
POTASSIUM SERPL-SCNC: 4 MMOL/L — SIGNIFICANT CHANGE UP (ref 3.5–5.3)
PROT SERPL-MCNC: 7.6 G/DL — SIGNIFICANT CHANGE UP (ref 6–8.3)
RBC # BLD: 4.75 M/UL — SIGNIFICANT CHANGE UP (ref 3.8–5.2)
RBC # FLD: 14.8 % — HIGH (ref 10.3–14.5)
RH IG SCN BLD-IMP: POSITIVE — SIGNIFICANT CHANGE UP
SODIUM SERPL-SCNC: 138 MMOL/L — SIGNIFICANT CHANGE UP (ref 135–145)
WBC # BLD: 4.73 K/UL — SIGNIFICANT CHANGE UP (ref 3.8–10.5)
WBC # FLD AUTO: 4.73 K/UL — SIGNIFICANT CHANGE UP (ref 3.8–10.5)

## 2025-04-13 PROCEDURE — 99233 SBSQ HOSP IP/OBS HIGH 50: CPT | Mod: GC

## 2025-04-13 RX ORDER — DEXAMETHASONE 0.5 MG/1
4 TABLET ORAL ONCE
Refills: 0 | Status: COMPLETED | OUTPATIENT
Start: 2025-04-13 | End: 2025-04-13

## 2025-04-13 RX ADMIN — Medication 62.5 MILLIMOLE(S): at 21:46

## 2025-04-13 RX ADMIN — ERYTHROMYCIN 1 APPLICATION(S): 5 OINTMENT OPHTHALMIC at 21:48

## 2025-04-13 RX ADMIN — OLANZAPINE 2.5 MILLIGRAM(S): 10 TABLET ORAL at 21:48

## 2025-04-13 RX ADMIN — ENOXAPARIN SODIUM 30 MILLIGRAM(S): 100 INJECTION SUBCUTANEOUS at 06:39

## 2025-04-13 RX ADMIN — ERYTHROMYCIN 1 APPLICATION(S): 5 OINTMENT OPHTHALMIC at 06:38

## 2025-04-13 RX ADMIN — ERYTHROMYCIN 1 APPLICATION(S): 5 OINTMENT OPHTHALMIC at 13:24

## 2025-04-13 RX ADMIN — DEXAMETHASONE 4 MILLIGRAM(S): 0.5 TABLET ORAL at 11:24

## 2025-04-13 RX ADMIN — Medication 1 APPLICATION(S): at 06:37

## 2025-04-13 NOTE — PROGRESS NOTE ADULT - SUBJECTIVE AND OBJECTIVE BOX
OVERNIGHT EVENTS:    SUBJECTIVE / INTERVAL HPI: Patient seen and examined at bedside.     VITAL SIGNS:  Vital Signs Last 24 Hrs  T(C): 36.4 (13 Apr 2025 06:55), Max: 36.5 (12 Apr 2025 13:30)  T(F): 97.6 (13 Apr 2025 06:55), Max: 97.7 (12 Apr 2025 13:30)  HR: 95 (13 Apr 2025 06:55) (64 - 95)  BP: 118/- (13 Apr 2025 06:55) (94/59 - 128/75)  BP(mean): 93 (12 Apr 2025 21:38) (70 - 93)  RR: 18 (13 Apr 2025 06:55) (12 - 34)  SpO2: 97% (13 Apr 2025 06:55) (92% - 98%)    Parameters below as of 13 Apr 2025 06:55  Patient On (Oxygen Delivery Method): room air      I&O's Summary      PHYSICAL EXAM:    General: NAD  HEENT: NC/AT; PERRL, anicteric sclera; MMM  Neck: supple  Cardiovascular: +S1/S2; RRR  Respiratory: CTA B/L; no W/R/R  Gastrointestinal: soft, NT/ND; +BSx4  Extremities: WWP; no edema, clubbing or cyanosis  Vascular: 2+ radial, DP/PT pulses B/L  Neurological: AAOx3; no focal deficits    MEDICATIONS:  MEDICATIONS  (STANDING):  chlorhexidine 2% Cloths 1 Application(s) Topical <User Schedule>  enoxaparin Injectable 30 milliGRAM(s) SubCutaneous every 24 hours  erythromycin   Ointment 1 Application(s) Both EYES three times a day  OLANZapine 2.5 milliGRAM(s) Oral at bedtime    MEDICATIONS  (PRN):      ALLERGIES:  Allergies    citrus (Unknown)  No Known Drug Allergies    Intolerances        LABS:                        13.6   12.23 )-----------( 276      ( 12 Apr 2025 05:11 )             41.4     04-12    141  |  108  |  16  ----------------------------<  94  4.5   |  22  |  0.66    Ca    9.2      12 Apr 2025 05:11  Phos  3.2     04-12  Mg     2.1     04-12    TPro  7.0  /  Alb  3.0[L]  /  TBili  0.3  /  DBili  x   /  AST  17  /  ALT  13  /  AlkPhos  75  04-12    PT/INR - ( 11 Apr 2025 14:00 )   PT: 13.9 sec;   INR: 1.19          PTT - ( 11 Apr 2025 14:00 )  PTT:22.3 sec  Urinalysis Basic - ( 12 Apr 2025 05:11 )    Color: x / Appearance: x / SG: x / pH: x  Gluc: 94 mg/dL / Ketone: x  / Bili: x / Urobili: x   Blood: x / Protein: x / Nitrite: x   Leuk Esterase: x / RBC: x / WBC x   Sq Epi: x / Non Sq Epi: x / Bacteria: x      CAPILLARY BLOOD GLUCOSE      POCT Blood Glucose.: 98 mg/dL (12 Apr 2025 23:50)      RADIOLOGY & ADDITIONAL TESTS: Reviewed.   OVERNIGHT EVENTS: LEXIE    SUBJECTIVE / INTERVAL HPI: Patient seen and examined at bedside. Patient grunts to some questions but speech is mostly unintelligible.     VITAL SIGNS:  Vital Signs Last 24 Hrs  T(C): 36.4 (13 Apr 2025 06:55), Max: 36.5 (12 Apr 2025 13:30)  T(F): 97.6 (13 Apr 2025 06:55), Max: 97.7 (12 Apr 2025 13:30)  HR: 95 (13 Apr 2025 06:55) (64 - 95)  BP: 118/- (13 Apr 2025 06:55) (94/59 - 128/75)  BP(mean): 93 (12 Apr 2025 21:38) (70 - 93)  RR: 18 (13 Apr 2025 06:55) (12 - 34)  SpO2: 97% (13 Apr 2025 06:55) (92% - 98%)    Parameters below as of 13 Apr 2025 06:55  Patient On (Oxygen Delivery Method): room air      I&O's Summary      PHYSICAL EXAM:    General: Laying in bed, occasional vocalizations but not intelligible as speech. Does not appear to be in acute distress/pain  HEENT: Lips and tongue do not appear edematous. Oropharynx visualized without obstruction. MMM. B/l eczematous changes to boby-orbital skin. EOMI  Neck: supple  Cardiovascular: +S1/S2; RRR  Respiratory: CTA B/L; no W/R/R  Gastrointestinal: soft, NT/ND; +BSx4  Extremities: WWP; no edema, clubbing or cyanosis  Vascular: 2+ radial, DP/PT pulses B/L  Neurological: Alert but does not answer questions; no overt focal deficits. Moves all extremities spontaneously    MEDICATIONS:  MEDICATIONS  (STANDING):  chlorhexidine 2% Cloths 1 Application(s) Topical <User Schedule>  enoxaparin Injectable 30 milliGRAM(s) SubCutaneous every 24 hours  erythromycin   Ointment 1 Application(s) Both EYES three times a day  OLANZapine 2.5 milliGRAM(s) Oral at bedtime    MEDICATIONS  (PRN):      ALLERGIES:  Allergies    citrus (Unknown)  No Known Drug Allergies    Intolerances        LABS:                        13.6   12.23 )-----------( 276      ( 12 Apr 2025 05:11 )             41.4     04-12    141  |  108  |  16  ----------------------------<  94  4.5   |  22  |  0.66    Ca    9.2      12 Apr 2025 05:11  Phos  3.2     04-12  Mg     2.1     04-12    TPro  7.0  /  Alb  3.0[L]  /  TBili  0.3  /  DBili  x   /  AST  17  /  ALT  13  /  AlkPhos  75  04-12    PT/INR - ( 11 Apr 2025 14:00 )   PT: 13.9 sec;   INR: 1.19          PTT - ( 11 Apr 2025 14:00 )  PTT:22.3 sec  Urinalysis Basic - ( 12 Apr 2025 05:11 )    Color: x / Appearance: x / SG: x / pH: x  Gluc: 94 mg/dL / Ketone: x  / Bili: x / Urobili: x   Blood: x / Protein: x / Nitrite: x   Leuk Esterase: x / RBC: x / WBC x   Sq Epi: x / Non Sq Epi: x / Bacteria: x      CAPILLARY BLOOD GLUCOSE      POCT Blood Glucose.: 98 mg/dL (12 Apr 2025 23:50)      RADIOLOGY & ADDITIONAL TESTS: Reviewed.

## 2025-04-13 NOTE — PROGRESS NOTE ADULT - PROBLEM SELECTOR PLAN 3
Currently at baseline mental status. Received ketamine in ED. Home med olanzapine 2.5 and trazodone. Stroke code in ED for decrease tongue tone determined to have lose of tone described 2/2 tongue edema.  - restart home meds- olanzapine 2.5mg

## 2025-04-13 NOTE — PROGRESS NOTE ADULT - TIME BILLING
70 min spent on face to face encounter as well as chart review
70 min spent on face to face encounter as well as chart review

## 2025-04-13 NOTE — PROGRESS NOTE ADULT - PROBLEM SELECTOR PLAN 1
Per health aid, swelling increased from baseline. Received epinephrine and steroids in the ED. ENT scope without concern for swelling of the airway. In list of home meds, tacrolimus cream for discoid lupus, which may cause angioedema, and could have accidentally ingested. Otherwise patient is in group home, and may have been exposed medication for other patient. Less likely hereditary angioedema, though reasonable to work up. Less likely psychogenic, though patient is on olanzapine, atypical antipsychotic, and low likelihood for tardive dyskinesia. Improvement in tongue swelling after epinephrine and steroids.   - f/u C1 esterase inhibitor, C1Q, C3 and C4 complement  - ent recs  - monitor airway  - aspiration precautions

## 2025-04-13 NOTE — PROGRESS NOTE ADULT - ASSESSMENT
58 y/o blind F with PMHx down syndrome, discoid lupus presenting with tongue swelling. ICU consulted for airway monitoring, s/p ENT scope without signs of airway swelling, improvement in tongue swelling, transferred to [facility] for further monitoring.

## 2025-04-14 VITALS
DIASTOLIC BLOOD PRESSURE: 68 MMHG | TEMPERATURE: 98 F | HEART RATE: 100 BPM | SYSTOLIC BLOOD PRESSURE: 106 MMHG | RESPIRATION RATE: 18 BRPM | OXYGEN SATURATION: 96 %

## 2025-04-14 LAB
ANION GAP SERPL CALC-SCNC: 12 MMOL/L — SIGNIFICANT CHANGE UP (ref 5–17)
BASOPHILS # BLD AUTO: 0.04 K/UL — SIGNIFICANT CHANGE UP (ref 0–0.2)
BASOPHILS NFR BLD AUTO: 0.9 % — SIGNIFICANT CHANGE UP (ref 0–2)
BUN SERPL-MCNC: 13 MG/DL — SIGNIFICANT CHANGE UP (ref 7–23)
CALCIUM SERPL-MCNC: 8.8 MG/DL — SIGNIFICANT CHANGE UP (ref 8.4–10.5)
CHLORIDE SERPL-SCNC: 102 MMOL/L — SIGNIFICANT CHANGE UP (ref 96–108)
CO2 SERPL-SCNC: 23 MMOL/L — SIGNIFICANT CHANGE UP (ref 22–31)
CREAT SERPL-MCNC: 0.6 MG/DL — SIGNIFICANT CHANGE UP (ref 0.5–1.3)
EGFR: 102 ML/MIN/1.73M2 — SIGNIFICANT CHANGE UP
EGFR: 102 ML/MIN/1.73M2 — SIGNIFICANT CHANGE UP
EOSINOPHIL # BLD AUTO: 0.04 K/UL — SIGNIFICANT CHANGE UP (ref 0–0.5)
EOSINOPHIL NFR BLD AUTO: 0.9 % — SIGNIFICANT CHANGE UP (ref 0–6)
GLUCOSE SERPL-MCNC: 84 MG/DL — SIGNIFICANT CHANGE UP (ref 70–99)
HCT VFR BLD CALC: 44 % — SIGNIFICANT CHANGE UP (ref 34.5–45)
HGB BLD-MCNC: 14.8 G/DL — SIGNIFICANT CHANGE UP (ref 11.5–15.5)
LYMPHOCYTES # BLD AUTO: 1.09 K/UL — SIGNIFICANT CHANGE UP (ref 1–3.3)
LYMPHOCYTES # BLD AUTO: 24.1 % — SIGNIFICANT CHANGE UP (ref 13–44)
MAGNESIUM SERPL-MCNC: 2.2 MG/DL — SIGNIFICANT CHANGE UP (ref 1.6–2.6)
MCHC RBC-ENTMCNC: 31.7 PG — SIGNIFICANT CHANGE UP (ref 27–34)
MCHC RBC-ENTMCNC: 33.6 G/DL — SIGNIFICANT CHANGE UP (ref 32–36)
MCV RBC AUTO: 94.2 FL — SIGNIFICANT CHANGE UP (ref 80–100)
MONOCYTES # BLD AUTO: 0.44 K/UL — SIGNIFICANT CHANGE UP (ref 0–0.9)
MONOCYTES NFR BLD AUTO: 9.8 % — SIGNIFICANT CHANGE UP (ref 2–14)
NEUTROPHILS # BLD AUTO: 2.76 K/UL — SIGNIFICANT CHANGE UP (ref 1.8–7.4)
NEUTROPHILS NFR BLD AUTO: 60.7 % — SIGNIFICANT CHANGE UP (ref 43–77)
PHOSPHATE SERPL-MCNC: 2.6 MG/DL — SIGNIFICANT CHANGE UP (ref 2.5–4.5)
PLATELET # BLD AUTO: 292 K/UL — SIGNIFICANT CHANGE UP (ref 150–400)
POTASSIUM SERPL-MCNC: 3.9 MMOL/L — SIGNIFICANT CHANGE UP (ref 3.5–5.3)
POTASSIUM SERPL-SCNC: 3.9 MMOL/L — SIGNIFICANT CHANGE UP (ref 3.5–5.3)
RBC # BLD: 4.67 M/UL — SIGNIFICANT CHANGE UP (ref 3.8–5.2)
RBC # FLD: 15 % — HIGH (ref 10.3–14.5)
SODIUM SERPL-SCNC: 137 MMOL/L — SIGNIFICANT CHANGE UP (ref 135–145)
SSA-52 (RO52) (ENA) ANTIBODY, IGG: 14 AU/ML — SIGNIFICANT CHANGE UP (ref 0–40)
SSA-60 (RO60) (ENA) ANTIBODY, IGG: 6 AU/ML — SIGNIFICANT CHANGE UP (ref 0–40)
WBC # BLD: 4.54 K/UL — SIGNIFICANT CHANGE UP (ref 3.8–10.5)
WBC # FLD AUTO: 4.54 K/UL — SIGNIFICANT CHANGE UP (ref 3.8–10.5)

## 2025-04-14 PROCEDURE — 36415 COLL VENOUS BLD VENIPUNCTURE: CPT

## 2025-04-14 PROCEDURE — 86850 RBC ANTIBODY SCREEN: CPT

## 2025-04-14 PROCEDURE — 86235 NUCLEAR ANTIGEN ANTIBODY: CPT

## 2025-04-14 PROCEDURE — 99239 HOSP IP/OBS DSCHRG MGMT >30: CPT

## 2025-04-14 PROCEDURE — 83735 ASSAY OF MAGNESIUM: CPT

## 2025-04-14 PROCEDURE — 80076 HEPATIC FUNCTION PANEL: CPT

## 2025-04-14 PROCEDURE — 86140 C-REACTIVE PROTEIN: CPT

## 2025-04-14 PROCEDURE — 86901 BLOOD TYPING SEROLOGIC RH(D): CPT

## 2025-04-14 PROCEDURE — 82746 ASSAY OF FOLIC ACID SERUM: CPT

## 2025-04-14 PROCEDURE — 86160 COMPLEMENT ANTIGEN: CPT

## 2025-04-14 PROCEDURE — 96374 THER/PROPH/DIAG INJ IV PUSH: CPT | Mod: XU

## 2025-04-14 PROCEDURE — 86038 ANTINUCLEAR ANTIBODIES: CPT

## 2025-04-14 PROCEDURE — 82962 GLUCOSE BLOOD TEST: CPT

## 2025-04-14 PROCEDURE — 85025 COMPLETE CBC W/AUTO DIFF WBC: CPT

## 2025-04-14 PROCEDURE — 80053 COMPREHEN METABOLIC PANEL: CPT

## 2025-04-14 PROCEDURE — 96375 TX/PRO/DX INJ NEW DRUG ADDON: CPT

## 2025-04-14 PROCEDURE — 86225 DNA ANTIBODY NATIVE: CPT

## 2025-04-14 PROCEDURE — 82310 ASSAY OF CALCIUM: CPT

## 2025-04-14 PROCEDURE — 99156 MOD SED OTH PHYS/QHP 5/>YRS: CPT

## 2025-04-14 PROCEDURE — 99291 CRITICAL CARE FIRST HOUR: CPT

## 2025-04-14 PROCEDURE — 70498 CT ANGIOGRAPHY NECK: CPT | Mod: MC

## 2025-04-14 PROCEDURE — 84443 ASSAY THYROID STIM HORMONE: CPT

## 2025-04-14 PROCEDURE — 70450 CT HEAD/BRAIN W/O DYE: CPT | Mod: MC

## 2025-04-14 PROCEDURE — 70496 CT ANGIOGRAPHY HEAD: CPT | Mod: MC

## 2025-04-14 PROCEDURE — 0042T: CPT | Mod: MC

## 2025-04-14 PROCEDURE — 80048 BASIC METABOLIC PNL TOTAL CA: CPT

## 2025-04-14 PROCEDURE — 96372 THER/PROPH/DIAG INJ SC/IM: CPT

## 2025-04-14 PROCEDURE — 86900 BLOOD TYPING SEROLOGIC ABO: CPT

## 2025-04-14 PROCEDURE — 84100 ASSAY OF PHOSPHORUS: CPT

## 2025-04-14 PROCEDURE — 85730 THROMBOPLASTIN TIME PARTIAL: CPT

## 2025-04-14 PROCEDURE — 82607 VITAMIN B-12: CPT

## 2025-04-14 PROCEDURE — 85610 PROTHROMBIN TIME: CPT

## 2025-04-14 PROCEDURE — 85027 COMPLETE CBC AUTOMATED: CPT

## 2025-04-14 RX ORDER — ACETAMINOPHEN 500 MG/5ML
475 LIQUID (ML) ORAL ONCE
Refills: 0 | Status: COMPLETED | OUTPATIENT
Start: 2025-04-14 | End: 2025-04-14

## 2025-04-14 RX ORDER — ERYTHROMYCIN 5 MG/G
1 OINTMENT OPHTHALMIC
Qty: 0 | Refills: 0 | DISCHARGE
Start: 2025-04-14

## 2025-04-14 RX ORDER — ERYTHROMYCIN 5 MG/G
1 OINTMENT OPHTHALMIC
Qty: 1 | Refills: 0
Start: 2025-04-14 | End: 2025-04-20

## 2025-04-14 RX ORDER — DEXAMETHASONE 0.5 MG/1
4 TABLET ORAL ONCE
Refills: 0 | Status: COMPLETED | OUTPATIENT
Start: 2025-04-14 | End: 2025-04-14

## 2025-04-14 RX ADMIN — DEXAMETHASONE 4 MILLIGRAM(S): 0.5 TABLET ORAL at 10:27

## 2025-04-14 RX ADMIN — Medication 1 APPLICATION(S): at 06:32

## 2025-04-14 RX ADMIN — ENOXAPARIN SODIUM 30 MILLIGRAM(S): 100 INJECTION SUBCUTANEOUS at 06:40

## 2025-04-14 RX ADMIN — Medication 190 MILLIGRAM(S): at 15:40

## 2025-04-14 RX ADMIN — ERYTHROMYCIN 1 APPLICATION(S): 5 OINTMENT OPHTHALMIC at 06:40

## 2025-04-14 RX ADMIN — ERYTHROMYCIN 1 APPLICATION(S): 5 OINTMENT OPHTHALMIC at 13:31

## 2025-04-14 NOTE — PROGRESS NOTE ADULT - SUBJECTIVE AND OBJECTIVE BOX
OVERNIGHT EVENTS:    SUBJECTIVE / INTERVAL HPI: Patient seen and examined at bedside.     VITAL SIGNS:  Vital Signs Last 24 Hrs  T(C): 36.3 (14 Apr 2025 06:25), Max: 36.5 (13 Apr 2025 11:20)  T(F): 97.4 (14 Apr 2025 06:25), Max: 97.7 (13 Apr 2025 11:20)  HR: 85 (14 Apr 2025 06:25) (85 - 100)  BP: 119/69 (14 Apr 2025 06:25) (93/62 - 122/84)  BP(mean): 97 (13 Apr 2025 20:56) (97 - 97)  RR: 17 (14 Apr 2025 06:25) (17 - 18)  SpO2: 95% (14 Apr 2025 06:25) (93% - 95%)    Parameters below as of 14 Apr 2025 06:25  Patient On (Oxygen Delivery Method): room air      I&O's Summary      PHYSICAL EXAM:    General: NAD  HEENT: NC/AT; PERRL, anicteric sclera; MMM  Neck: supple  Cardiovascular: +S1/S2; RRR  Respiratory: CTA B/L; no W/R/R  Gastrointestinal: soft, NT/ND; +BSx4  Extremities: WWP; no edema, clubbing or cyanosis  Vascular: 2+ radial, DP/PT pulses B/L  Neurological: AAOx3; no focal deficits    MEDICATIONS:  MEDICATIONS  (STANDING):  chlorhexidine 2% Cloths 1 Application(s) Topical <User Schedule>  enoxaparin Injectable 30 milliGRAM(s) SubCutaneous every 24 hours  erythromycin   Ointment 1 Application(s) Both EYES three times a day  OLANZapine 2.5 milliGRAM(s) Oral at bedtime    MEDICATIONS  (PRN):  EPINEPHrine     1 mG/mL Injectable 0.3 milliGRAM(s) IntraMuscular once PRN anyphylaxis      ALLERGIES:  Allergies    citrus (Unknown)  No Known Drug Allergies    Intolerances        LABS:                        14.8   4.73  )-----------( 318      ( 13 Apr 2025 12:00 )             43.8     04-13    138  |  99  |  17  ----------------------------<  132[H]  4.0   |  24  |  0.66    Ca    8.7      13 Apr 2025 12:00  Phos  2.2     04-13  Mg     2.0     04-13    TPro  7.6  /  Alb  3.0[L]  /  TBili  0.2  /  DBili  x   /  AST  22  /  ALT  15  /  AlkPhos  80  04-13      Urinalysis Basic - ( 13 Apr 2025 12:00 )    Color: x / Appearance: x / SG: x / pH: x  Gluc: 132 mg/dL / Ketone: x  / Bili: x / Urobili: x   Blood: x / Protein: x / Nitrite: x   Leuk Esterase: x / RBC: x / WBC x   Sq Epi: x / Non Sq Epi: x / Bacteria: x      CAPILLARY BLOOD GLUCOSE      POCT Blood Glucose.: 90 mg/dL (14 Apr 2025 06:31)      RADIOLOGY & ADDITIONAL TESTS: Reviewed.

## 2025-04-14 NOTE — PROGRESS NOTE ADULT - NUTRITIONAL ASSESSMENT
This patient has been assessed with a concern for Malnutrition and has been determined to have a diagnosis/diagnoses of Severe protein-calorie malnutrition.    This patient is being managed with:   Diet NPO-  Entered: Apr 11 2025  4:31PM  
This patient has been assessed with a concern for Malnutrition and has been determined to have a diagnosis/diagnoses of Severe protein-calorie malnutrition.    This patient is being managed with:   Diet Pureed-  Entered: Apr 12 2025  4:26PM  
This patient has been assessed with a concern for Malnutrition and has been determined to have a diagnosis/diagnoses of Severe protein-calorie malnutrition.    This patient is being managed with:   Diet Pureed-  Entered: Apr 12 2025  4:26PM  
This patient has been assessed with a concern for Malnutrition and has been determined to have a diagnosis/diagnoses of Severe protein-calorie malnutrition.    This patient is being managed with:   Diet NPO-  Entered: Apr 11 2025  4:31PM

## 2025-04-14 NOTE — DISCHARGE NOTE PROVIDER - NSDCMRMEDTOKEN_GEN_ALL_CORE_FT
EpiPen 2-Ja 0.3 mg injectable kit: 0.3 milligram(s) intramuscularly every 2 to 5 minutes as needed for  allergy symptoms  Eyemycin 0.5% ophthalmic ointment: 1 application to each affected eye 3 times a day  predniSONE 20 mg oral tablet: 2 tab(s) orally once a day   ciclopirox 1% topical shampoo: Apply topically to affected area  EpiPen 2-Ja 0.3 mg injectable kit: 0.3 milligram(s) intramuscularly every 2 to 5 minutes as needed for  allergy symptoms  Eyemycin 0.5% ophthalmic ointment: 1 application to each affected eye 3 times a day  mirtazapine 15 mg oral tablet: 1 tab(s) orally  mometasone 0.1% topical cream: Apply topically to affected area  predniSONE 20 mg oral tablet: 2 tab(s) orally once a day  tacrolimus 0.1% topical cream: Apply topically to affected area

## 2025-04-14 NOTE — PROGRESS NOTE ADULT - PROBLEM SELECTOR PLAN 2
Per aid from facility, patient had a telehealth visit 2 days ago for redness of eyes. Patient is blind and she was prescribed a medication for "pink eye" but never received it yet. On exam, patient has bilateral eyelid crusting with 2+ conjunctival injection, PERRLA, clear corneas OU.  - c/w erythromycin ointment TID

## 2025-04-14 NOTE — PROGRESS NOTE ADULT - ASSESSMENT
60 y/o blind F with PMHx down syndrome, discoid lupus presenting with tongue swelling. ICU consulted for airway monitoring, s/p ENT scope without signs of airway swelling, improvement in tongue swelling, transferred to [facility] for further monitoring.

## 2025-04-14 NOTE — DISCHARGE NOTE PROVIDER - HOSPITAL COURSE
60 y/o blind F with PMHx down syndrome, discoid lupus presenting with tongue swelling. ICU consulted for airway monitoring, s/p ENT scope without signs of airway swelling, improvement in tongue swelling, transferred to [facility] for further monitoring.     Allergic reaction. Angioedema  ·  Plan: Per health aid, swelling increased from baseline. Received epinephrine and steroids in the ED. ENT scope without concern for swelling of the airway. In list of home meds, tacrolimus cream for discoid lupus, which may cause angioedema, and could have accidentally ingested. Otherwise patient is in group home, and may have been exposed medication for other patient. Less likely hereditary angioedema, though reasonable to work up. Less likely psychogenic, though patient is on olanzapine, atypical antipsychotic, and low likelihood for tardive dyskinesia. Improvement in tongue swelling after epinephrine and steroids.   - Continue prednisone 40 qd for 5 days  - Epi pen rx given to pt  - F/u with PCP and allergist    Bacterial conjunctivitis.   ·  Plan: Per aid from facility, patient had a telehealth visit 2 days ago for redness of eyes. Patient is blind and she was prescribed a medication for "pink eye" but never received it yet. On exam, patient has bilateral eyelid crusting with 2+ conjunctival injection, PERRLA, clear corneas OU.  - c/w erythromycin ointment TID.  - Discontinue ofloxacin drops    Debility  Patient has significant mobility needs and requires the use of a wheelchair.    Physical exam on DC:  General: Laying in bed, occasional vocalizations but not intelligible as speech. Does not appear to be in acute distress/pain  HEENT: Lips and tongue do not appear edematous. Oropharynx visualized without obstruction. MMM. B/l eczematous changes to boby-orbital skin. EOMI  Neck: supple  Cardiovascular: +S1/S2; RRR  Respiratory: CTA B/L; no W/R/R  Gastrointestinal: soft, NT/ND; +BSx4  Extremities: WWP; no edema, clubbing or cyanosis  Vascular: 2+ radial, DP/PT pulses B/L  Neurological: Alert but does not answer questions; no overt focal deficits. Moves all extremities spontaneously   58 y/o blind F with PMHx down syndrome, discoid lupus presenting with tongue swelling. ICU consulted for airway monitoring, s/p ENT scope without signs of airway swelling, improvement in tongue swelling, transferred to [facility] for further monitoring.     Allergic reaction. Angioedema  ·  Plan: Per health aid, swelling increased from baseline. Received epinephrine and steroids in the ED. ENT scope without concern for swelling of the airway. In list of home meds, tacrolimus cream for discoid lupus, which may cause angioedema, and could have accidentally ingested. Otherwise patient is in group home, and may have been exposed medication for other patient. Less likely hereditary angioedema, though reasonable to work up. Less likely psychogenic, though patient is on olanzapine, atypical antipsychotic, and low likelihood for tardive dyskinesia. Improvement in tongue swelling after epinephrine and steroids.   - Continue prednisone 40 qd for 5 days  - Epi pen rx given to pt  - F/u with PCP and allergist    Bacterial conjunctivitis.   ·  Plan: Per aid from facility, patient had a telehealth visit 2 days ago for redness of eyes. Patient is blind and she was prescribed a medication for "pink eye" but never received it yet. On exam, patient has bilateral eyelid crusting with 2+ conjunctival injection, PERRLA, clear corneas OU.  - c/w erythromycin ointment TID.  - Discontinue ofloxacin drops (as it may have contributed to/caused the tounge swelling)     Debility  Patient has significant mobility needs and requires the use of a wheelchair.    Physical exam on DC:  General: Laying in bed, occasional vocalizations but not intelligible as speech. Does not appear to be in acute distress/pain  HEENT: Lips and tongue do not appear edematous. Oropharynx visualized without obstruction. MMM. B/l eczematous changes to boby-orbital skin. EOMI  Neck: supple  Cardiovascular: +S1/S2; RRR  Respiratory: CTA B/L; no W/R/R  Gastrointestinal: soft, NT/ND; +BSx4  Extremities: WWP; no edema, clubbing or cyanosis  Vascular: 2+ radial, DP/PT pulses B/L  Neurological: Alert but does not answer questions; no overt focal deficits. Moves all extremities spontaneously

## 2025-04-14 NOTE — DISCHARGE NOTE NURSING/CASE MANAGEMENT/SOCIAL WORK - PATIENT PORTAL LINK FT
You can access the FollowMyHealth Patient Portal offered by [facility] by registering at the following website: [email or web address]. By joining [facility]’s FollowMyHealth portal, you will also be able to view your health information using other applications (apps) compatible with our system.

## 2025-04-14 NOTE — DISCHARGE NOTE PROVIDER - NSDCCPCAREPLAN_GEN_ALL_CORE_FT
PRINCIPAL DISCHARGE DIAGNOSIS  Diagnosis: Tongue swelling  Assessment and Plan of Treatment: Swelling, or angioedema, is often an allergic reaction that affects parts of your face like your eyes or lips. If it affects your airways, it’s a medical emergency and you need to get help. You were treated with medications to decrease swelling and manage the allergic reaction. It is unclear what caused the allergic reaction, but please discontinue the afloxacin drops. Please follow up in 1-2 weeks with PCP and an allergist to evaluate for possible causes of allergy.   If you have tongue swelling, trouble breathing, sudden itching, please call 911, administer the EpiPen, and seek emergency medical care.  MEDICATIONS TO START:  - prednisone 40 mg once daily for 5 days  - Epi Pen AS NEEDED FOR ALLERGIC REACTION     PRINCIPAL DISCHARGE DIAGNOSIS  Diagnosis: Tongue swelling  Assessment and Plan of Treatment: Swelling, or angioedema, is often an allergic reaction that affects parts of your face like your eyes or lips. If it affects your airways, it’s a medical emergency and you need to get help. You were treated with medications to decrease swelling and manage the allergic reaction. It is unclear what caused the allergic reaction, but please discontinue the afloxacin drops. Please follow up in 1-2 weeks with PCP and an allergist to evaluate for possible causes of allergy.   If you have tongue swelling, trouble breathing, sudden itching, please call 911, administer the EpiPen, and seek emergency medical care.  MEDICATIONS TO START:  - prednisone 40 mg once daily for 5 days  - Epi Pen AS NEEDED FOR ALLERGIC REACTION      SECONDARY DISCHARGE DIAGNOSES  Diagnosis: Bacterial conjunctivitis  Assessment and Plan of Treatment: Please avoid afloxacin eye drops as it may have contributed to/caused the tounge swelling. Use erythromycin eye drops instead for 7 days. Follow up with PCP if symptoms persist/worsened

## 2025-04-14 NOTE — DISCHARGE NOTE NURSING/CASE MANAGEMENT/SOCIAL WORK - FINANCIAL ASSISTANCE
[facility] provides services at a reduced cost to those who are determined to be eligible through [facility]’s financial assistance program. Information regarding [facility]’s financial assistance program can be found by going to [email or web address] or by calling [phone].

## 2025-04-14 NOTE — DISCHARGE NOTE PROVIDER - CARE PROVIDER_API CALL
Bayron Cintron  Allergy and Immunology  [facility] [place] [address], [address]  [place] [address]  Phone: [phone]  Fax: [phone]  Follow Up Time: 2 weeks

## 2025-04-14 NOTE — DISCHARGE NOTE NURSING/CASE MANAGEMENT/SOCIAL WORK - NSDCPEFALRISK_GEN_ALL_CORE
For information on Fall & Injury Prevention, visit: [email or web address] OR  [email or web address] OR  https://www.cdc.gov/steadi/patient.html
None

## 2025-04-14 NOTE — PROGRESS NOTE ADULT - ATTENDING COMMENTS
Angioedema of unclear etiology has resolved. Serologic workup unrevealing to date. Plan as per above. Transfer to [facility].
58 y/o blind F with PMHx down syndrome, discoid lupus presenting with tongue swelling. ICU consulted for airway monitoring, s/p ENT scope without signs of airway swelling, improvement in tongue swelling, transferred to [facility] for further monitoring.   Unclear about the cause of allergic reaction, no significant new medications   Genearl: alert and spontaneous eye opening but does not converse  HEENT: Lower lip edema improved , Tongue appears normal   Resp: Clear breath sounds no wheezing   Cardio: RRR   Gastro: soft non tender   skin: rash on cheeks improving   plan : will cont a short course of pred 40 x 5 days - rash and swelling improved   Follow up with  for return back to facility   Would recommend an outpatient apt with allergy and immunology to do thorough testing given hx is difficult to obtain and pt is unable to communicate if there is a subsequent episode .
58 y/o blind F with PMHx down syndrome, discoid lupus presenting with tongue swelling. ICU consulted for airway monitoring, s/p ENT scope without signs of airway swelling, improvement in tongue swelling, transferred to [facility] for further monitoring.   Unclear about the cause of allergic reaction, no significant new medications   Genearl: alert and spontaneous eye opening but does not converse  HEENT: LOWER LIP SWELLING , Tongue appears normal   Resp: Clear breath sounds no wheezing   Cardio: RRR   Gastro: soft non tender   Plan   Follow up with  for return back to facility likely tomorrow    will give decadron today and confirm wiliam meds   Would recommend an outpatient apt with allergy and immunology to do thorough testing given hx is difficult to obtain and pt is unable to communicate if there is a subsequent episode
58 y/o blind F with PMHx down syndrome, discoid lupus presenting with tongue swelling. ICU consulted for airway monitoring, s/p ENT scope without signs of airway swelling, improvement in tongue swelling, transferred to [facility] for further monitoring.   Unclear about the cause of allergic reaction, no significant new medications   Genearl: alert and spontaneous eye opening but does not converse  Resp: Clear breath sounds no wheezing   Cardio: RRR   Gastro: soft non tender   Plan   Follow up with  for return back to facility likely tomorrow vs monday   Would recommend an outpatient apt with allergy and immunology to do thorough testing given hx is difficult to obtain and pt is unable to communicate if there is a subsequent episode

## 2025-04-14 NOTE — PROGRESS NOTE ADULT - PROBLEM SELECTOR PLAN 4
Home meds tacrolimus .1% cream, ciclopirox 1%, and mometasone  - hold for now until angioedema resolves

## 2025-04-15 LAB — ANA TITR SER: NEGATIVE — SIGNIFICANT CHANGE UP

## 2025-04-15 RX ORDER — PREDNISONE 20 MG/1
1 TABLET ORAL
Qty: 5 | Refills: 0
Start: 2025-04-15 | End: 2025-04-19

## 2025-04-15 RX ORDER — PREDNISONE 20 MG/1
2 TABLET ORAL
Qty: 10 | Refills: 0
Start: 2025-04-15 | End: 2025-04-19

## 2025-04-17 PROBLEM — L93.0 DISCOID LUPUS ERYTHEMATOSUS: Chronic | Status: ACTIVE | Noted: 2024-07-30

## 2025-04-17 RX ORDER — TACROLIMUS 0.3 MG/G
0 OINTMENT TOPICAL
Refills: 0 | DISCHARGE

## 2025-04-17 RX ORDER — CICLOPIROX 10 MG/.96ML
1 SHAMPOO TOPICAL
Refills: 0 | DISCHARGE

## 2025-04-17 RX ORDER — MOMETASONE FUROATE 1 MG/G
1 OINTMENT TOPICAL
Refills: 0 | DISCHARGE

## 2025-04-17 RX ORDER — MIRTAZAPINE 30 MG/1
1 TABLET, FILM COATED ORAL
Refills: 0 | DISCHARGE

## 2025-04-17 RX ORDER — OFLOXACIN 3 MG/ML
1 SOLUTION OPHTHALMIC
Refills: 0 | DISCHARGE

## 2025-04-18 DIAGNOSIS — L93.0 DISCOID LUPUS ERYTHEMATOSUS: ICD-10-CM

## 2025-04-18 DIAGNOSIS — T78.3XXA ANGIONEUROTIC EDEMA, INITIAL ENCOUNTER: ICD-10-CM

## 2025-04-18 DIAGNOSIS — J96.01 ACUTE RESPIRATORY FAILURE WITH HYPOXIA: ICD-10-CM

## 2025-04-18 DIAGNOSIS — G80.9 CEREBRAL PALSY, UNSPECIFIED: ICD-10-CM

## 2025-04-18 DIAGNOSIS — E87.0 HYPEROSMOLALITY AND HYPERNATREMIA: ICD-10-CM

## 2025-04-18 DIAGNOSIS — Q90.9 DOWN SYNDROME, UNSPECIFIED: ICD-10-CM

## 2025-04-18 DIAGNOSIS — E43 UNSPECIFIED SEVERE PROTEIN-CALORIE MALNUTRITION: ICD-10-CM

## 2025-04-18 DIAGNOSIS — Z99.3 DEPENDENCE ON WHEELCHAIR: ICD-10-CM

## 2025-04-18 LAB — C1Q AG SERPL RAJI CELL-ACNC: 18 MG/DL — SIGNIFICANT CHANGE UP (ref 10.3–20.5)
